# Patient Record
Sex: FEMALE | Race: WHITE | NOT HISPANIC OR LATINO | URBAN - METROPOLITAN AREA
[De-identification: names, ages, dates, MRNs, and addresses within clinical notes are randomized per-mention and may not be internally consistent; named-entity substitution may affect disease eponyms.]

---

## 2022-10-11 ENCOUNTER — TELEMEDICINE (OUTPATIENT)
Dept: PSYCHIATRY | Facility: CLINIC | Age: 57
End: 2022-10-11

## 2022-10-11 DIAGNOSIS — G47.00 INSOMNIA, UNSPECIFIED TYPE: ICD-10-CM

## 2022-10-11 DIAGNOSIS — F41.1 GENERALIZED ANXIETY DISORDER: ICD-10-CM

## 2022-10-11 DIAGNOSIS — F33.1 MAJOR DEPRESSIVE DISORDER, RECURRENT, MODERATE (HCC): Primary | ICD-10-CM

## 2022-10-11 PROCEDURE — 99214 OFFICE O/P EST MOD 30 MIN: CPT | Performed by: PSYCHIATRY & NEUROLOGY

## 2022-10-11 RX ORDER — BUSPIRONE HYDROCHLORIDE 15 MG/1
15 TABLET ORAL 3 TIMES DAILY
COMMUNITY
Start: 2022-09-20 | End: 2022-10-11 | Stop reason: SDUPTHER

## 2022-10-11 RX ORDER — QUETIAPINE FUMARATE 300 MG/1
600 TABLET, FILM COATED ORAL
COMMUNITY
Start: 2022-08-25 | End: 2022-10-11 | Stop reason: SDUPTHER

## 2022-10-11 RX ORDER — MIRTAZAPINE 45 MG/1
TABLET, FILM COATED ORAL
COMMUNITY
Start: 2022-09-18 | End: 2022-10-11 | Stop reason: SDUPTHER

## 2022-10-11 RX ORDER — BUSPIRONE HYDROCHLORIDE 15 MG/1
15 TABLET ORAL 3 TIMES DAILY
Qty: 270 TABLET | Refills: 0 | Status: SHIPPED | OUTPATIENT
Start: 2022-10-11

## 2022-10-11 RX ORDER — QUETIAPINE FUMARATE 100 MG/1
TABLET, FILM COATED ORAL
Qty: 180 TABLET | Refills: 0 | Status: SHIPPED | OUTPATIENT
Start: 2022-10-11

## 2022-10-11 RX ORDER — QUETIAPINE FUMARATE 100 MG/1
TABLET, FILM COATED ORAL
COMMUNITY
Start: 2022-08-03 | End: 2022-10-11 | Stop reason: SDUPTHER

## 2022-10-11 RX ORDER — DULOXETIN HYDROCHLORIDE 60 MG/1
120 CAPSULE, DELAYED RELEASE ORAL DAILY
Qty: 190 CAPSULE | Refills: 0 | Status: SHIPPED | OUTPATIENT
Start: 2022-10-11

## 2022-10-11 RX ORDER — GABAPENTIN 800 MG/1
800 TABLET ORAL 3 TIMES DAILY
COMMUNITY
Start: 2022-09-20 | End: 2022-10-11 | Stop reason: SDUPTHER

## 2022-10-11 RX ORDER — CLONIDINE HYDROCHLORIDE 0.2 MG/1
0.2 TABLET ORAL
Qty: 90 TABLET | Refills: 0 | Status: SHIPPED | OUTPATIENT
Start: 2022-10-11

## 2022-10-11 RX ORDER — DULOXETIN HYDROCHLORIDE 60 MG/1
120 CAPSULE, DELAYED RELEASE ORAL DAILY
COMMUNITY
Start: 2022-08-29 | End: 2022-10-11 | Stop reason: SDUPTHER

## 2022-10-11 RX ORDER — LAMOTRIGINE 25 MG/1
25 TABLET ORAL DAILY
Qty: 90 TABLET | Refills: 0 | Status: SHIPPED | OUTPATIENT
Start: 2022-10-11

## 2022-10-11 RX ORDER — QUETIAPINE FUMARATE 300 MG/1
600 TABLET, FILM COATED ORAL
Qty: 180 TABLET | Refills: 0 | Status: SHIPPED | OUTPATIENT
Start: 2022-10-11

## 2022-10-11 RX ORDER — MIRTAZAPINE 45 MG/1
45 TABLET, FILM COATED ORAL
Qty: 90 TABLET | Refills: 0 | Status: SHIPPED | OUTPATIENT
Start: 2022-10-11

## 2022-10-11 RX ORDER — GABAPENTIN 800 MG/1
800 TABLET ORAL 3 TIMES DAILY
Qty: 270 TABLET | Refills: 0 | Status: SHIPPED | OUTPATIENT
Start: 2022-10-11

## 2022-10-11 RX ORDER — CLONIDINE HYDROCHLORIDE 0.2 MG/1
0.2 TABLET ORAL
COMMUNITY
Start: 2022-09-18 | End: 2022-10-11 | Stop reason: SDUPTHER

## 2022-10-11 NOTE — PSYCH
Virtual Regular Visit    Verification of patient location:    Patient is located in the following state in which I hold an active license [place]      Assessment/Plan:  Add lamictal 25 mg   Continue other meds      Problem List Items Addressed This Visit    None     Visit Diagnoses     Major depressive disorder, recurrent, moderate (HCC)    -  Primary    Relevant Medications    lamoTRIgine (LaMICtal) 25 mg tablet    busPIRone (BUSPAR) 15 mg tablet    DULoxetine (CYMBALTA) 60 mg delayed release capsule    mirtazapine (REMERON) 45 MG tablet    QUEtiapine (SEROquel) 100 mg tablet    QUEtiapine (SEROquel) 300 mg tablet    Generalized anxiety disorder        Relevant Medications    busPIRone (BUSPAR) 15 mg tablet    cloNIDine (CATAPRES) 0 2 mg tablet    DULoxetine (CYMBALTA) 60 mg delayed release capsule    gabapentin (NEURONTIN) 800 mg tablet    mirtazapine (REMERON) 45 MG tablet    QUEtiapine (SEROquel) 100 mg tablet    QUEtiapine (SEROquel) 300 mg tablet    Insomnia, unspecified type        Relevant Medications    mirtazapine (REMERON) 45 MG tablet          Goals addressed in session: Goal 1          Reason for visit is   Chief Complaint   Patient presents with   • Medication Management        Encounter provider Cris Crain MD    Provider located at [address] [place] [address]  [phone]      Recent Visits  No visits were found meeting these conditions  Showing recent visits within past 7 days and meeting all other requirements  Today's Visits  Date Type Provider Dept   10/11/22 Telemedicine Cris Crain, [address] [place] [address] today's visits and meeting all other requirements  Future Appointments  No visits were found meeting these conditions  Showing future appointments within next 150 days and meeting all other requirements       The patient was identified by name and date of birth   Tammie Keane was informed that this is a telemedicine visit and that the visit is being conducted through[employer] and patient was informed this is a secure, HIPAA-complaint platform  She agrees to proceed     My office door was closed  No one else was in the room  She acknowledged consent and understanding of privacy and security of the video platform  The patient has agreed to participate and understands they can discontinue the visit at any time  There was no audio connection and I had to use the phone as well  Patient is aware this is a billable service  Subjective  Vernette Halsted is a 61 y o  female with mood swings, anxiety, insomnia presents for regular f/u   Compliant with meds, denies SE  H/o wellbutrin SE - SI  Mood - mood swings - 2-3  X week for 1 day feeling " happy", active and productive and 3-4 days of sadness, low energy, isolated, stays in bed, increased sleep  Anxiety - worries " all the time"; panicky in public places  Feels better at home, but is able to go to work  Denies medical problems      HPI     Past Medical History:   Diagnosis Date   • Gastroparesis        History reviewed  No pertinent surgical history      Current Outpatient Medications   Medication Sig Dispense Refill   • busPIRone (BUSPAR) 15 mg tablet Take 1 tablet (15 mg total) by mouth 3 (three) times a day 270 tablet 0   • cloNIDine (CATAPRES) 0 2 mg tablet Take 1 tablet (0 2 mg total) by mouth daily at bedtime 90 tablet 0   • DULoxetine (CYMBALTA) 60 mg delayed release capsule Take 2 capsules (120 mg total) by mouth daily 190 capsule 0   • gabapentin (NEURONTIN) 800 mg tablet Take 1 tablet (800 mg total) by mouth 3 (three) times a day 270 tablet 0   • lamoTRIgine (LaMICtal) 25 mg tablet Take 1 tablet (25 mg total) by mouth daily 90 tablet 0   • mirtazapine (REMERON) 45 MG tablet Take 1 tablet (45 mg total) by mouth daily at bedtime 90 tablet 0   • QUEtiapine (SEROquel) 100 mg tablet Take 1 tab in thre morning and at noon 180 tablet 0   • QUEtiapine (SEROquel) 300 mg tablet Take 2 tablets (600 mg total) by mouth daily at bedtime 180 tablet 0     No current facility-administered medications for this visit  Not on File    Review of Systems   Constitutional: Negative for activity change and appetite change  Psychiatric/Behavioral: Positive for sleep disturbance (sleeps 5 hrs)  Negative for suicidal ideas  Video Exam    There were no vitals filed for this visit  Physical Exam  Constitutional:       Appearance: Normal appearance  She is normal weight  Neurological:      Mental Status: She is alert  Psychiatric:         Attention and Perception: Attention and perception normal          Mood and Affect: Affect normal  Mood is anxious and depressed  Speech: Speech normal          Behavior: Behavior is cooperative  Thought Content:  Thought content normal          Judgment: Judgment normal           I spent 17 minutes directly with the patient during this visit

## 2022-10-31 ENCOUNTER — TELEPHONE (OUTPATIENT)
Dept: PSYCHIATRY | Facility: CLINIC | Age: 57
End: 2022-10-31

## 2022-10-31 NOTE — TELEPHONE ENCOUNTER
Client left message for Dr Bart Dahl that she is still having trouble sleeping  She saw her for an appointment a few weeks ago, but feels "I need something else to help me sleep, or an increase in medication " Message sent to Dr Bart Dahl for recommendations

## 2022-11-01 ENCOUNTER — TELEPHONE (OUTPATIENT)
Dept: PSYCHIATRY | Facility: CLINIC | Age: 57
End: 2022-11-01

## 2022-11-01 NOTE — TELEPHONE ENCOUNTER
Client called and informed per Dr Chela Holman: that she is on meds for insomnia already, and if she wants to discuss any further med adjustment she can make a sooner appointment  Client states"I am going to keep trying the meds I am on, and will call for a sooner appointment if needed " Informed Dr Chela Holman of above

## 2023-01-09 ENCOUNTER — TELEMEDICINE (OUTPATIENT)
Dept: PSYCHIATRY | Facility: CLINIC | Age: 58
End: 2023-01-09

## 2023-01-09 DIAGNOSIS — G47.00 INSOMNIA, UNSPECIFIED TYPE: ICD-10-CM

## 2023-01-09 DIAGNOSIS — F33.1 MAJOR DEPRESSIVE DISORDER, RECURRENT, MODERATE (HCC): Primary | ICD-10-CM

## 2023-01-09 DIAGNOSIS — F41.1 GENERALIZED ANXIETY DISORDER: ICD-10-CM

## 2023-01-09 RX ORDER — LAMOTRIGINE 25 MG/1
50 TABLET ORAL DAILY
Qty: 180 TABLET | Refills: 0 | Status: SHIPPED | OUTPATIENT
Start: 2023-01-09

## 2023-01-09 RX ORDER — PANTOPRAZOLE SODIUM 40 MG/1
40 TABLET, DELAYED RELEASE ORAL 2 TIMES DAILY
COMMUNITY
Start: 2022-12-26

## 2023-01-09 RX ORDER — BUSPIRONE HYDROCHLORIDE 15 MG/1
15 TABLET ORAL 3 TIMES DAILY
Qty: 270 TABLET | Refills: 0 | Status: SHIPPED | OUTPATIENT
Start: 2023-01-09

## 2023-01-09 RX ORDER — DOXYCYCLINE HYCLATE 50 MG/1
324 CAPSULE, GELATIN COATED ORAL
COMMUNITY
Start: 2022-11-28 | End: 2023-02-26

## 2023-01-09 RX ORDER — GABAPENTIN 800 MG/1
800 TABLET ORAL 3 TIMES DAILY
Qty: 270 TABLET | Refills: 0 | Status: SHIPPED | OUTPATIENT
Start: 2023-01-09

## 2023-01-09 RX ORDER — SODIUM FLUORIDE 6 MG/ML
PASTE, DENTIFRICE DENTAL 2 TIMES DAILY
COMMUNITY
Start: 2023-01-07

## 2023-01-09 RX ORDER — MIRTAZAPINE 45 MG/1
45 TABLET, FILM COATED ORAL
Qty: 90 TABLET | Refills: 0 | Status: SHIPPED | OUTPATIENT
Start: 2023-01-09

## 2023-01-09 RX ORDER — PSYLLIUM HUSK 3.4 G/7G
1 POWDER ORAL DAILY
COMMUNITY
Start: 2022-11-18

## 2023-01-09 RX ORDER — DULOXETIN HYDROCHLORIDE 60 MG/1
120 CAPSULE, DELAYED RELEASE ORAL DAILY
Qty: 180 CAPSULE | Refills: 0 | Status: SHIPPED | OUTPATIENT
Start: 2023-01-09

## 2023-01-09 RX ORDER — QUETIAPINE FUMARATE 300 MG/1
600 TABLET, FILM COATED ORAL
Qty: 180 TABLET | Refills: 0 | Status: SHIPPED | OUTPATIENT
Start: 2023-01-09

## 2023-01-09 RX ORDER — CLONIDINE HYDROCHLORIDE 0.2 MG/1
0.2 TABLET ORAL
Qty: 90 TABLET | Refills: 0 | Status: SHIPPED | OUTPATIENT
Start: 2023-01-09

## 2023-01-09 RX ORDER — QUETIAPINE FUMARATE 100 MG/1
TABLET, FILM COATED ORAL
Qty: 180 TABLET | Refills: 0 | Status: SHIPPED | OUTPATIENT
Start: 2023-01-09

## 2023-01-09 NOTE — PSYCH
Virtual Regular Visit    Verification of patient location:    Patient is located in the following state in which I hold an [place] license [place]      Assessment/Plan:    Problem List Items Addressed This Visit    None  Visit Diagnoses     Major depressive disorder, recurrent, moderate (HCC)    -  Primary    Relevant Medications    QUEtiapine (SEROquel) 300 mg tablet    QUEtiapine (SEROquel) 100 mg tablet    mirtazapine (REMERON) 45 MG tablet    lamoTRIgine (LaMICtal) 25 mg tablet    DULoxetine (CYMBALTA) 60 mg delayed release capsule    busPIRone (BUSPAR) 15 mg tablet    Generalized anxiety disorder        Relevant Medications    QUEtiapine (SEROquel) 300 mg tablet    QUEtiapine (SEROquel) 100 mg tablet    mirtazapine (REMERON) 45 MG tablet    gabapentin (NEURONTIN) 800 mg tablet    DULoxetine (CYMBALTA) 60 mg delayed release capsule    cloNIDine (CATAPRES) 0 2 mg tablet    busPIRone (BUSPAR) 15 mg tablet    Insomnia, unspecified type        Relevant Medications    mirtazapine (REMERON) 45 MG tablet          Goals addressed in session: Goal 1          Reason for visit is   Chief Complaint   Patient presents with   • Medication Management        Encounter provider Pb Watters MD    Provider located at [address] [facility]  [address]  [phone]      Recent Visits  No visits were found meeting these conditions  Showing recent visits within past 7 days and meeting all other requirements  Today's Visits  Date Type Provider Dept   01/09/23 Telemedicine Pb Watters, [address] [place] [address] today's visits and meeting all other requirements  Future Appointments  No visits were found meeting these conditions  Showing future appointments within next 150 days and meeting all other requirements       The patient was identified by name and date of birth   Tigre Nam was informed that this is a telemedicine visit and that the [facility]  She agrees to proceed     My office door was closed  No one else was in the room  She acknowledged consent and understanding of privacy and security of the video platform  The patient has agreed to participate and understands they can discontinue the visit at any time  Patient is aware this is a billable service  Subjective  Mona Gupta is a 61 y o  female with depression, anxiety and insomnia presents for regular f/u   Compliant with meds, denies SE  Recent admission for anemia - improved  works    HPI   Mood - better energy and motivation;more social; less mood swings;  but still gets depressed 2 x week ; all day - sad, tired  Lamictal is effective   Anxiety - anticipating anxiety  C/o poor sleep - takes seroquel 100 mg qd, 600 mg hs  Past Medical History:   Diagnosis Date   • Gastroparesis        History reviewed  No pertinent surgical history      Current Outpatient Medications   Medication Sig Dispense Refill   • busPIRone (BUSPAR) 15 mg tablet Take 1 tablet (15 mg total) by mouth 3 (three) times a day 270 tablet 0   • cloNIDine (CATAPRES) 0 2 mg tablet Take 1 tablet (0 2 mg total) by mouth daily at bedtime 90 tablet 0   • DULoxetine (CYMBALTA) 60 mg delayed release capsule Take 2 capsules (120 mg total) by mouth daily 180 capsule 0   • ferrous gluconate (FERGON) 324 mg tablet Take 324 mg by mouth     • gabapentin (NEURONTIN) 800 mg tablet Take 1 tablet (800 mg total) by mouth 3 (three) times a day 270 tablet 0   • lamoTRIgine (LaMICtal) 25 mg tablet Take 2 tablets (50 mg total) by mouth daily 180 tablet 0   • mirtazapine (REMERON) 45 MG tablet Take 1 tablet (45 mg total) by mouth daily at bedtime 90 tablet 0   • QUEtiapine (SEROquel) 100 mg tablet Take 1 tab  at noon and at night with 600 mg 180 tablet 0   • QUEtiapine (SEROquel) 300 mg tablet Take 2 tablets (600 mg total) by mouth daily at bedtime 180 tablet 0   • pantoprazole (PROTONIX) 40 mg tablet Take 40 mg by mouth 2 (two) times a day     • RA Vitamin B-12 TR 1000 MCG TBCR Take 1 tablet by mouth daily     • Sodium Fluoride 5000 PPM 1 1 % PSTE 2 (two) times a day       No current facility-administered medications for this visit  Not on File    Review of Systems   Constitutional: Negative for activity change  Psychiatric/Behavioral: Positive for dysphoric mood and sleep disturbance (5 hrs)  Negative for suicidal ideas  Video Exam    There were no vitals filed for this visit  Physical Exam  Constitutional:       Appearance: Normal appearance  She is normal weight  Neurological:      Mental Status: She is alert  Psychiatric:         Attention and Perception: Attention and perception normal          Mood and Affect: Affect normal  Mood is depressed  Speech: Speech normal          Behavior: Behavior normal  Behavior is cooperative  Thought Content:  Thought content normal          Judgment: Judgment normal           Visit Time    Visit Start Time: 8 20 am   Visit Stop Time: 8 29 am   Total Visit Duration: 19 minutes

## 2023-03-09 ENCOUNTER — TELEPHONE (OUTPATIENT)
Dept: PSYCHIATRY | Facility: CLINIC | Age: 58
End: 2023-03-09

## 2023-03-22 ENCOUNTER — TELEMEDICINE (OUTPATIENT)
Dept: PSYCHIATRY | Facility: CLINIC | Age: 58
End: 2023-03-22

## 2023-03-22 DIAGNOSIS — F41.1 GENERALIZED ANXIETY DISORDER: ICD-10-CM

## 2023-03-22 DIAGNOSIS — G47.00 INSOMNIA, UNSPECIFIED TYPE: ICD-10-CM

## 2023-03-22 DIAGNOSIS — F33.1 MAJOR DEPRESSIVE DISORDER, RECURRENT, MODERATE (HCC): Primary | ICD-10-CM

## 2023-03-22 RX ORDER — DULOXETIN HYDROCHLORIDE 60 MG/1
120 CAPSULE, DELAYED RELEASE ORAL DAILY
Qty: 180 CAPSULE | Refills: 0 | Status: SHIPPED | OUTPATIENT
Start: 2023-03-22

## 2023-03-22 RX ORDER — LAMOTRIGINE 25 MG/1
50 TABLET ORAL DAILY
Qty: 180 TABLET | Refills: 0 | Status: SHIPPED | OUTPATIENT
Start: 2023-03-22

## 2023-03-22 RX ORDER — MIRTAZAPINE 45 MG/1
45 TABLET, FILM COATED ORAL
Qty: 90 TABLET | Refills: 0 | Status: SHIPPED | OUTPATIENT
Start: 2023-03-22

## 2023-03-22 RX ORDER — BUSPIRONE HYDROCHLORIDE 15 MG/1
15 TABLET ORAL 3 TIMES DAILY
Qty: 270 TABLET | Refills: 0 | Status: SHIPPED | OUTPATIENT
Start: 2023-03-22

## 2023-03-22 RX ORDER — CLONIDINE HYDROCHLORIDE 0.2 MG/1
0.2 TABLET ORAL
Qty: 90 TABLET | Refills: 0 | Status: SHIPPED | OUTPATIENT
Start: 2023-03-22

## 2023-03-22 RX ORDER — QUETIAPINE FUMARATE 300 MG/1
600 TABLET, FILM COATED ORAL
Qty: 180 TABLET | Refills: 0 | Status: SHIPPED | OUTPATIENT
Start: 2023-03-22

## 2023-03-22 RX ORDER — GABAPENTIN 800 MG/1
800 TABLET ORAL 3 TIMES DAILY
Qty: 270 TABLET | Refills: 0 | Status: SHIPPED | OUTPATIENT
Start: 2023-03-22

## 2023-03-22 NOTE — PSYCH
Virtual Regular Visit    Verification of patient location:    Patient is located in the following state in which I hold an [place] license [place]      Assessment/Plan:    Problem List Items Addressed This Visit    None      Goals addressed in session: Goal 1          Reason for visit is   Chief Complaint   Patient presents with   • Medication Management        Encounter provider Brisa Ghotra MD    Provider located at [address] [facility]  100 [facility]  [address]  [phone]      Recent Visits  No visits were found meeting these conditions  Showing recent visits within past 7 days and meeting all other requirements  Today's Visits  Date Type Provider Dept   03/22/23 Telemedicine Brisa Ghotra, [address] [facility] today's visits and meeting all other requirements  Future Appointments  No visits were found meeting these conditions  Showing future appointments within next 150 days and meeting all other requirements       The patient was identified by name and date of birth  Mona Gupta was informed that this is a telemedicine visit and that the visit is being conducted [employer]  She agrees to proceed     My office door was closed  No one else was in the room  She acknowledged consent and understanding of privacy and security of the video platform  The patient has agreed to participate and understands they can discontinue the visit at any time  Patient is aware this is a billable service  Subjective  Mona Gupta is a 60 y o  female with depression, anxiety, insomnia presents for regular f/u     compliant with meds, denies SE  Denies acute medical problems  Walks 5 miles daily  Works part time, busy with house work      HPI   Mood - reports as good and stable; mild episodes of sadness sometimes   Good energy, active, social  Anxiety - mostly controlled; denies panic attacks or racing thoughts    Past Medical History:   Diagnosis Date   • Gastroparesis        History reviewed  No pertinent surgical history  Current Outpatient Medications   Medication Sig Dispense Refill   • cloNIDine (CATAPRES) 0 2 mg tablet Take 1 tablet (0 2 mg total) by mouth daily at bedtime 90 tablet 0   • DULoxetine (CYMBALTA) 60 mg delayed release capsule Take 2 capsules (120 mg total) by mouth daily 180 capsule 0   • gabapentin (NEURONTIN) 800 mg tablet Take 1 tablet (800 mg total) by mouth 3 (three) times a day 270 tablet 0   • lamoTRIgine (LaMICtal) 25 mg tablet Take 2 tablets (50 mg total) by mouth daily 180 tablet 0   • mirtazapine (REMERON) 45 MG tablet Take 1 tablet (45 mg total) by mouth daily at bedtime 90 tablet 0   • QUEtiapine (SEROquel) 100 mg tablet Take 1 tab  at noon and at night with 600 mg 180 tablet 0   • QUEtiapine (SEROquel) 300 mg tablet Take 2 tablets (600 mg total) by mouth daily at bedtime 180 tablet 0   • busPIRone (BUSPAR) 15 mg tablet Take 1 tablet (15 mg total) by mouth 3 (three) times a day 270 tablet 0   • ferrous gluconate (FERGON) 324 mg tablet Take 324 mg by mouth     • pantoprazole (PROTONIX) 40 mg tablet Take 40 mg by mouth 2 (two) times a day     • RA Vitamin B-12 TR 1000 MCG TBCR Take 1 tablet by mouth daily     • Sodium Fluoride 5000 PPM 1 1 % PSTE 2 (two) times a day       No current facility-administered medications for this visit  Not on File    Review of Systems   Constitutional: Negative for activity change and appetite change  Psychiatric/Behavioral: Negative for dysphoric mood, sleep disturbance and suicidal ideas  The patient is not nervous/anxious  Video Exam    There were no vitals filed for this visit  Physical Exam  Constitutional:       Appearance: Normal appearance  She is normal weight  Neurological:      Mental Status: She is alert     Psychiatric:         Attention and Perception: Attention and perception normal          Mood and Affect: Mood and affect normal  Speech: Speech normal          Behavior: Behavior normal  Behavior is cooperative  Thought Content:  Thought content normal          Judgment: Judgment normal           Visit Time    Visit Start Time: 8 58 am   Visit Stop Time: 9 05 am   Total Visit Duration: 15 minutes

## 2023-05-25 DIAGNOSIS — F33.1 MAJOR DEPRESSIVE DISORDER, RECURRENT, MODERATE (HCC): ICD-10-CM

## 2023-05-25 RX ORDER — QUETIAPINE FUMARATE 100 MG/1
TABLET, FILM COATED ORAL
Qty: 60 TABLET | Refills: 0 | Status: SHIPPED | OUTPATIENT
Start: 2023-05-25

## 2023-05-25 RX ORDER — QUETIAPINE FUMARATE 300 MG/1
600 TABLET, FILM COATED ORAL
Qty: 60 TABLET | Refills: 0 | Status: SHIPPED | OUTPATIENT
Start: 2023-05-25

## 2023-06-08 DIAGNOSIS — G47.00 INSOMNIA, UNSPECIFIED TYPE: ICD-10-CM

## 2023-06-08 RX ORDER — MIRTAZAPINE 45 MG/1
45 TABLET, FILM COATED ORAL
Qty: 90 TABLET | Refills: 0 | Status: SHIPPED | OUTPATIENT
Start: 2023-06-08

## 2023-06-21 ENCOUNTER — TELEMEDICINE (OUTPATIENT)
Dept: PSYCHIATRY | Facility: CLINIC | Age: 58
End: 2023-06-21
Payer: COMMERCIAL

## 2023-06-21 DIAGNOSIS — G47.00 INSOMNIA, UNSPECIFIED TYPE: ICD-10-CM

## 2023-06-21 DIAGNOSIS — F41.1 GENERALIZED ANXIETY DISORDER: ICD-10-CM

## 2023-06-21 DIAGNOSIS — F33.1 MAJOR DEPRESSIVE DISORDER, RECURRENT, MODERATE (HCC): Primary | ICD-10-CM

## 2023-06-21 PROCEDURE — 99214 OFFICE O/P EST MOD 30 MIN: CPT | Performed by: PSYCHIATRY & NEUROLOGY

## 2023-06-21 RX ORDER — CLONIDINE HYDROCHLORIDE 0.2 MG/1
0.2 TABLET ORAL
Qty: 90 TABLET | Refills: 0 | Status: SHIPPED | OUTPATIENT
Start: 2023-06-21

## 2023-06-21 RX ORDER — LAMOTRIGINE 25 MG/1
50 TABLET ORAL DAILY
Qty: 20 TABLET | Refills: 0 | Status: SHIPPED | OUTPATIENT
Start: 2023-06-21

## 2023-06-21 RX ORDER — QUETIAPINE FUMARATE 100 MG/1
TABLET, FILM COATED ORAL
Qty: 180 TABLET | Refills: 0 | Status: SHIPPED | OUTPATIENT
Start: 2023-06-21

## 2023-06-21 RX ORDER — BUSPIRONE HYDROCHLORIDE 15 MG/1
15 TABLET ORAL 3 TIMES DAILY
Qty: 270 TABLET | Refills: 0 | Status: SHIPPED | OUTPATIENT
Start: 2023-06-21

## 2023-06-21 RX ORDER — QUETIAPINE FUMARATE 300 MG/1
600 TABLET, FILM COATED ORAL
Qty: 180 TABLET | Refills: 0 | Status: SHIPPED | OUTPATIENT
Start: 2023-06-21

## 2023-06-21 RX ORDER — MIRTAZAPINE 45 MG/1
45 TABLET, FILM COATED ORAL
Qty: 90 TABLET | Refills: 0 | Status: SHIPPED | OUTPATIENT
Start: 2023-06-21

## 2023-06-21 RX ORDER — GABAPENTIN 800 MG/1
800 TABLET ORAL 3 TIMES DAILY
Qty: 270 TABLET | Refills: 0 | Status: SHIPPED | OUTPATIENT
Start: 2023-06-21

## 2023-06-21 RX ORDER — DULOXETIN HYDROCHLORIDE 60 MG/1
120 CAPSULE, DELAYED RELEASE ORAL DAILY
Qty: 180 CAPSULE | Refills: 0 | Status: SHIPPED | OUTPATIENT
Start: 2023-06-21

## 2023-06-21 NOTE — PSYCH
Virtual Regular Visit    Verification of patient location:    Patient is located at Home in the following state in which I hold an active license [place]      Assessment/Plan:    Problem List Items Addressed This Visit    None      Goals addressed in session: Goal 1          Reason for visit is   Chief Complaint   Patient presents with   • Medication Management        Encounter provider Betsy Rueda MD    Provider located at [address] [facility]  100 [facility]  [address]  [phone]      Recent Visits  No visits were found meeting these conditions  Showing recent visits within past 7 days and meeting all other requirements  Today's Visits  Date Type Provider Dept   06/21/23 Telemedicine Betsy Rueda, [facility] today's visits and meeting all other requirements  Future Appointments  No visits were found meeting these conditions  Showing future appointments within next 150 days and meeting all other requirements       The patient was identified by name and date of birth  Sri Yanes was informed that this is a telemedicine visit and that the visit is being conducted [employer]  She agrees to proceed     My office door was closed  No one else was in the room  She acknowledged consent and understanding of privacy and security of the video platform  The patient has agreed to participate and understands they can discontinue the visit at any time  Patient is aware this is a billable service  Subjective  Sri Yanes is a 60 y o  female with depression, anxiety and insomnia presents for regular f/u   Compliant with meds, denies SE    H/o asthma, inhaler prn  Works 30-40 hrs/week  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      HPI   Mood - reports as mostly good and stable; denies depression or mood swings   Active, functions at baseline; does ADLs  Anxiety - mostly controlled; but before parties or medical appts; before days with granddaughter - anticipating anxiety - worries; racing thoughts; tachycardia, sweating, restless, pacing, avoiding behavior and poor sleep    Past Medical History:   Diagnosis Date   • Gastroparesis        History reviewed  No pertinent surgical history  Current Outpatient Medications   Medication Sig Dispense Refill   • busPIRone (BUSPAR) 15 mg tablet Take 1 tablet (15 mg total) by mouth 3 (three) times a day 270 tablet 0   • cloNIDine (CATAPRES) 0 2 mg tablet Take 1 tablet (0 2 mg total) by mouth daily at bedtime 90 tablet 0   • DULoxetine (CYMBALTA) 60 mg delayed release capsule Take 2 capsules (120 mg total) by mouth daily 180 capsule 0   • ferrous gluconate (FERGON) 324 mg tablet Take 324 mg by mouth     • gabapentin (NEURONTIN) 800 mg tablet Take 1 tablet (800 mg total) by mouth 3 (three) times a day 270 tablet 0   • lamoTRIgine (LaMICtal) 25 mg tablet Take 2 tablets (50 mg total) by mouth daily 20 tablet 0   • mirtazapine (REMERON) 45 MG tablet Take 1 tablet (45 mg total) by mouth daily at bedtime 90 tablet 0   • pantoprazole (PROTONIX) 40 mg tablet Take 40 mg by mouth 2 (two) times a day     • QUEtiapine (SEROquel) 100 mg tablet Take 1 tab  at noon and at night with 600 mg 60 tablet 0   • QUEtiapine (SEROquel) 300 mg tablet Take 2 tablets (600 mg total) by mouth daily at bedtime 60 tablet 0   • RA Vitamin B-12 TR 1000 MCG TBCR Take 1 tablet by mouth daily     • Sodium Fluoride 5000 PPM 1 1 % PSTE 2 (two) times a day       No current facility-administered medications for this visit  Not on File    Review of Systems   Constitutional: Negative for activity change  Psychiatric/Behavioral: Negative for dysphoric mood, sleep disturbance and suicidal ideas  The patient is nervous/anxious  Video Exam    There were no vitals filed for this visit  Physical Exam  Constitutional:       Appearance: Normal appearance  She is normal weight     Neurological: Mental Status: She is alert  Psychiatric:         Attention and Perception: Attention and perception normal          Mood and Affect: Affect normal  Mood is anxious  Speech: Speech normal          Behavior: Behavior normal  Behavior is cooperative  Thought Content:  Thought content normal          Judgment: Judgment normal           Visit Time    Visit Start Time: 8 58 am   Visit Stop Time: 9 06 am   Total Visit Duration: 17 minutes

## 2023-07-21 DIAGNOSIS — F33.1 MAJOR DEPRESSIVE DISORDER, RECURRENT, MODERATE (HCC): ICD-10-CM

## 2023-07-24 DIAGNOSIS — F33.1 MAJOR DEPRESSIVE DISORDER, RECURRENT, MODERATE (HCC): ICD-10-CM

## 2023-07-24 RX ORDER — LAMOTRIGINE 25 MG/1
50 TABLET ORAL DAILY
Qty: 60 TABLET | Refills: 1 | Status: SHIPPED | OUTPATIENT
Start: 2023-07-24

## 2023-07-24 RX ORDER — LAMOTRIGINE 25 MG/1
50 TABLET ORAL DAILY
Qty: 20 TABLET | Refills: 0 | OUTPATIENT
Start: 2023-07-24

## 2023-08-27 DIAGNOSIS — F33.1 MAJOR DEPRESSIVE DISORDER, RECURRENT, MODERATE (HCC): ICD-10-CM

## 2023-08-28 RX ORDER — QUETIAPINE FUMARATE 300 MG/1
600 TABLET, FILM COATED ORAL
Qty: 180 TABLET | Refills: 0 | OUTPATIENT
Start: 2023-08-28

## 2023-09-13 ENCOUNTER — TELEMEDICINE (OUTPATIENT)
Dept: PSYCHIATRY | Facility: CLINIC | Age: 58
End: 2023-09-13
Payer: COMMERCIAL

## 2023-09-13 DIAGNOSIS — F41.1 GENERALIZED ANXIETY DISORDER: ICD-10-CM

## 2023-09-13 DIAGNOSIS — G47.00 INSOMNIA, UNSPECIFIED TYPE: ICD-10-CM

## 2023-09-13 DIAGNOSIS — F33.1 MAJOR DEPRESSIVE DISORDER, RECURRENT, MODERATE (HCC): Primary | ICD-10-CM

## 2023-09-13 PROCEDURE — 99214 OFFICE O/P EST MOD 30 MIN: CPT | Performed by: PSYCHIATRY & NEUROLOGY

## 2023-09-13 RX ORDER — CLONIDINE HYDROCHLORIDE 0.2 MG/1
0.2 TABLET ORAL
Qty: 90 TABLET | Refills: 0 | Status: SHIPPED | OUTPATIENT
Start: 2023-09-13

## 2023-09-13 RX ORDER — MIRTAZAPINE 45 MG/1
45 TABLET, FILM COATED ORAL
Qty: 90 TABLET | Refills: 0 | Status: SHIPPED | OUTPATIENT
Start: 2023-09-13

## 2023-09-13 RX ORDER — BUSPIRONE HYDROCHLORIDE 15 MG/1
15 TABLET ORAL 3 TIMES DAILY
Qty: 270 TABLET | Refills: 0 | Status: SHIPPED | OUTPATIENT
Start: 2023-09-13

## 2023-09-13 RX ORDER — QUETIAPINE FUMARATE 300 MG/1
600 TABLET, FILM COATED ORAL
Qty: 180 TABLET | Refills: 0 | Status: SHIPPED | OUTPATIENT
Start: 2023-09-13

## 2023-09-13 RX ORDER — DULOXETIN HYDROCHLORIDE 60 MG/1
120 CAPSULE, DELAYED RELEASE ORAL DAILY
Qty: 180 CAPSULE | Refills: 0 | Status: SHIPPED | OUTPATIENT
Start: 2023-09-13

## 2023-09-13 RX ORDER — PROPRANOLOL HYDROCHLORIDE 10 MG/1
10 TABLET ORAL 2 TIMES DAILY PRN
Qty: 60 TABLET | Refills: 2 | Status: SHIPPED | OUTPATIENT
Start: 2023-09-13

## 2023-09-13 RX ORDER — GABAPENTIN 800 MG/1
800 TABLET ORAL 3 TIMES DAILY
Qty: 270 TABLET | Refills: 0 | Status: SHIPPED | OUTPATIENT
Start: 2023-09-13

## 2023-09-13 RX ORDER — LAMOTRIGINE 25 MG/1
50 TABLET ORAL DAILY
Qty: 60 TABLET | Refills: 1 | Status: SHIPPED | OUTPATIENT
Start: 2023-09-13

## 2023-09-13 NOTE — PATIENT INSTRUCTIONS
Add propranolol 10 mg bid prn  Continue other meds  Consider PTSD therapy or PHP  Pt declined to be referred to [facility]

## 2023-09-13 NOTE — PSYCH
Virtual Regular Visit    Verification of patient location:    Patient is located at Home in the following state in which I hold an active license [place]      Assessment/Plan:    Problem List Items Addressed This Visit    None      Goals addressed in session: Goal 1          Reason for visit is   Chief Complaint   Patient presents with   • Medication Management        Encounter provider Michele Ferrera MD    Provider located at [address] [place] [address],[address]  [address] [place] [address]  [phone]      Recent Visits  No visits were found meeting these conditions. Showing recent visits within past 7 days and meeting all other requirements  Today's Visits  Date Type Provider Dept   09/13/23 Telemedicine Michele Ferrera, [address] today's visits and meeting all other requirements  Future Appointments  No visits were found meeting these conditions. Showing future appointments within next 150 days and meeting all other requirements     My office door was closed. TREATMENT PLAN (Medication Management Only)        [facility]    Name and Date of Birth:  Kwame Logan 60 y.o. 1965  Date of Treatment Plan: September 13, 2023  Diagnosis/Diagnoses:  No diagnosis found. Strengths/Personal Resources for Self-Care: taking medications as prescribed, motivation for treatment. Area/Areas of need (in own words): anxiety, depression, sleep problems  1. Long Term Goal: continue improvement insleep. Target Date:6 months - 3/13/2024  Person/Persons responsible for completion of goal: Cassy  2. Short Term Objective (s) - How will we reach this goal?:   A. Provider new recommended medication/dosage changes and/or continue medication(s): continue current medications as prescribed Cymbalta, Lamictal, Seroquel, Atarax, Propranolol. B. N/A.  C. N/A.   Target Date:6 months - 3/13/2024  Person/Persons Responsible for Completion of Goal: Felisha Ramon  Progress Towards Goals: continuing treatment  Treatment Modality: medication management every 3 months  Review due 180 days from date of this plan: 6 months - 3/13/2024  Expected length of service: ongoing treatment  My Physician/PA/NP and I have developed this plan together and I agree to work on the goals and objectives. I understand the treatment goals that were developed for my treatment. The patient was identified by name and date of birth. Karan Soto was informed that this is a telemedicine visit and that the visit is being conducted [employer]. She agrees to proceed. .   No one else was in the room. She acknowledged consent and understanding of privacy and security of the video platform. The patient has agreed to participate and understands they can discontinue the visit at any time. Patient is aware this is a billable service. Subjective  Karan Soto is a 60 y.o. female with depression, anxiety, insomnia presents for regular f/u   Takes Seroquel 600 mg , reports higher dose not effective  Continued other meds, denies SE . In [facility] at [facility] - trauma discussion - getting worse emotionally  Denies acute medical problems      HPI   Anxiety - increased racing thoughts and worries; poor focus  3 x week - for 1 hr - episodes of tachycardia, hyperventilation, pacing; " losing my mind"  Pt uses coping skills - not much effective  Mood - episodes of low self esteem; preoccupied with past trauma experience    Past Medical History:   Diagnosis Date   • Gastroparesis        History reviewed. No pertinent surgical history.     Current Outpatient Medications   Medication Sig Dispense Refill   • busPIRone (BUSPAR) 15 mg tablet Take 1 tablet (15 mg total) by mouth 3 (three) times a day 270 tablet 0   • cloNIDine (CATAPRES) 0.2 mg tablet Take 1 tablet (0.2 mg total) by mouth daily at bedtime 90 tablet 0   • DULoxetine (CYMBALTA) 60 mg delayed release capsule Take 2 capsules (120 mg total) by mouth daily 180 capsule 0   • gabapentin (NEURONTIN) 800 mg tablet Take 1 tablet (800 mg total) by mouth 3 (three) times a day 270 tablet 0   • lamoTRIgine (LaMICtal) 25 mg tablet Take 2 tablets (50 mg total) by mouth daily 60 tablet 1   • mirtazapine (REMERON) 45 MG tablet Take 1 tablet (45 mg total) by mouth daily at bedtime 90 tablet 0   • QUEtiapine (SEROquel) 100 mg tablet Take 1 or 2 tab at night with 600 mg 180 tablet 0   • QUEtiapine (SEROquel) 300 mg tablet Take 2 tablets (600 mg total) by mouth daily at bedtime 180 tablet 0   • ferrous gluconate (FERGON) 324 mg tablet Take 324 mg by mouth     • pantoprazole (PROTONIX) 40 mg tablet Take 40 mg by mouth 2 (two) times a day     • RA Vitamin B-12 TR 1000 MCG TBCR Take 1 tablet by mouth daily     • Sodium Fluoride 5000 PPM 1.1 % PSTE 2 (two) times a day       No current facility-administered medications for this visit. Not on File    Review of Systems   Constitutional: Negative for activity change and appetite change. Psychiatric/Behavioral: Positive for dysphoric mood and sleep disturbance (difficult to fall asleep). Negative for suicidal ideas. The patient is nervous/anxious. Video Exam    There were no vitals filed for this visit. Physical Exam  Constitutional:       Appearance: Normal appearance. She is normal weight. Neurological:      Mental Status: She is alert. Psychiatric:         Attention and Perception: Attention and perception normal.         Mood and Affect: Affect normal. Mood is anxious. Speech: Speech normal.         Behavior: Behavior normal. Behavior is cooperative. Thought Content:  Thought content normal.         Judgment: Judgment normal.          Visit Time    Visit Start Time: 8.58 am   Visit Stop Time: 9.09 am   Total Visit Duration: 18 minutes

## 2023-09-14 ENCOUNTER — TELEPHONE (OUTPATIENT)
Dept: PSYCHIATRY | Facility: CLINIC | Age: 58
End: 2023-09-14

## 2023-09-14 DIAGNOSIS — F33.1 MAJOR DEPRESSIVE DISORDER, RECURRENT, MODERATE (HCC): ICD-10-CM

## 2023-09-14 NOTE — TELEPHONE ENCOUNTER
Patient calling because she did not receive script for seroquel 100mg. Script sent yesterday for 300mg 2 at bedtime but patient states she takes 100mg 2 tabs during the day as well. Med note from 6/21 states: "Take seroquel 100 mg 1-2 tab hs with 600 mg for anxiety". Pt requesting script be sent to new pharmacy-[phone]. Forwarding for review.

## 2023-09-19 DIAGNOSIS — F41.1 GENERALIZED ANXIETY DISORDER: Primary | ICD-10-CM

## 2023-09-19 RX ORDER — QUETIAPINE FUMARATE 100 MG/1
TABLET, FILM COATED ORAL
Qty: 180 TABLET | Refills: 0 | Status: SHIPPED | OUTPATIENT
Start: 2023-09-19

## 2023-09-19 NOTE — PROGRESS NOTES
Prescription for PRN quetiapine (Seroquel) sent to be taken with the 600 mg as bed time for anxiety as mentioned in last visit note.

## 2023-09-19 NOTE — TELEPHONE ENCOUNTER
Pt called back, said that this was sent to the wrong [facility], asked that it be resent to the one on [address]

## 2023-09-21 ENCOUNTER — TELEPHONE (OUTPATIENT)
Dept: PSYCHIATRY | Facility: CLINIC | Age: 58
End: 2023-09-21

## 2023-09-21 NOTE — TELEPHONE ENCOUNTER
Called client to schedule 3 month follow-up appt. with Dr. Morgan Rock. Client said she will call back tomorrow to schedule appt.

## 2023-11-13 DIAGNOSIS — F33.1 MAJOR DEPRESSIVE DISORDER, RECURRENT, MODERATE (HCC): ICD-10-CM

## 2023-11-13 RX ORDER — LAMOTRIGINE 25 MG/1
50 TABLET ORAL DAILY
Qty: 60 TABLET | Refills: 0 | Status: SHIPPED | OUTPATIENT
Start: 2023-11-13

## 2023-11-27 DIAGNOSIS — G47.00 INSOMNIA, UNSPECIFIED TYPE: ICD-10-CM

## 2023-11-27 RX ORDER — MIRTAZAPINE 45 MG/1
45 TABLET, FILM COATED ORAL
Qty: 17 TABLET | Refills: 0 | Status: SHIPPED | OUTPATIENT
Start: 2023-11-27

## 2023-12-13 ENCOUNTER — TELEMEDICINE (OUTPATIENT)
Dept: PSYCHIATRY | Facility: CLINIC | Age: 58
End: 2023-12-13
Payer: COMMERCIAL

## 2023-12-13 DIAGNOSIS — G47.00 INSOMNIA, UNSPECIFIED TYPE: ICD-10-CM

## 2023-12-13 DIAGNOSIS — F41.1 GENERALIZED ANXIETY DISORDER: ICD-10-CM

## 2023-12-13 DIAGNOSIS — F33.1 MAJOR DEPRESSIVE DISORDER, RECURRENT, MODERATE (HCC): ICD-10-CM

## 2023-12-13 PROCEDURE — 99214 OFFICE O/P EST MOD 30 MIN: CPT | Performed by: PSYCHIATRY & NEUROLOGY

## 2023-12-13 RX ORDER — GABAPENTIN 800 MG/1
800 TABLET ORAL 3 TIMES DAILY
Qty: 270 TABLET | Refills: 0 | Status: SHIPPED | OUTPATIENT
Start: 2023-12-13

## 2023-12-13 RX ORDER — BUSPIRONE HYDROCHLORIDE 15 MG/1
15 TABLET ORAL 3 TIMES DAILY
Qty: 270 TABLET | Refills: 0 | Status: SHIPPED | OUTPATIENT
Start: 2023-12-13

## 2023-12-13 RX ORDER — QUETIAPINE FUMARATE 300 MG/1
600 TABLET, FILM COATED ORAL
Qty: 180 TABLET | Refills: 0 | Status: SHIPPED | OUTPATIENT
Start: 2023-12-13

## 2023-12-13 RX ORDER — QUETIAPINE FUMARATE 100 MG/1
TABLET, FILM COATED ORAL
Qty: 180 TABLET | Refills: 0 | Status: SHIPPED | OUTPATIENT
Start: 2023-12-13

## 2023-12-13 RX ORDER — LAMOTRIGINE 25 MG/1
50 TABLET ORAL DAILY
Qty: 180 TABLET | Refills: 0 | Status: SHIPPED | OUTPATIENT
Start: 2023-12-13

## 2023-12-13 RX ORDER — MIRTAZAPINE 45 MG/1
45 TABLET, FILM COATED ORAL
Qty: 90 TABLET | Refills: 0 | Status: SHIPPED | OUTPATIENT
Start: 2023-12-13

## 2023-12-13 RX ORDER — CLONIDINE HYDROCHLORIDE 0.2 MG/1
0.2 TABLET ORAL
Qty: 90 TABLET | Refills: 0 | Status: SHIPPED | OUTPATIENT
Start: 2023-12-13

## 2023-12-13 RX ORDER — DULOXETIN HYDROCHLORIDE 60 MG/1
120 CAPSULE, DELAYED RELEASE ORAL DAILY
Qty: 180 CAPSULE | Refills: 0 | Status: SHIPPED | OUTPATIENT
Start: 2023-12-13

## 2023-12-13 RX ORDER — PROPRANOLOL HYDROCHLORIDE 10 MG/1
10 TABLET ORAL 2 TIMES DAILY PRN
Qty: 60 TABLET | Refills: 2 | Status: SHIPPED | OUTPATIENT
Start: 2023-12-13

## 2023-12-13 NOTE — PSYCH
Virtual Regular Visit    Verification of patient location:    Patient is located at Home in the following state in which I hold an active license [place]      Assessment/Plan:    Problem List Items Addressed This Visit    None      Goals addressed in session: Goal 1          Reason for visit is   Chief Complaint   Patient presents with    Medication Management        Encounter provider Junaid Hills MD    Provider located at [address] [place] [address],[address]  [address] [facility] [address] [place] [address]  [phone]      Recent Visits  No visits were found meeting these conditions. Showing recent visits within past 7 days and meeting all other requirements  Today's Visits  Date Type Provider Dept   12/13/23 Telemedicine Junaid Hills, [address] today's visits and meeting all other requirements  Future Appointments  No visits were found meeting these conditions. Showing future appointments within next 150 days and meeting all other requirements       The patient was identified by name and date of birth. Monalisa Montana was informed that this is a telemedicine visit and that the visit is being conducted [employer]. She agrees to proceed. .  My office door was closed. No one else was in the room. She acknowledged consent and understanding of privacy and security of the video platform. The patient has agreed to participate and understands they can discontinue the visit at any time. Patient is aware this is a billable service. Subjective  Monalisa Montana is a 60 y.o. female with depression, anxiety, insomnia presents for regular f/u  .   Compliant with meds, denies SE  Recovering from cold  Lives with father, works  In therapy 2 x week, meditation, yoga      HPI   Anxiety - " terrible", " panicky" - pt reports episodes of SOB, tachycardia, sweating, racing thoughts every morning  She takes propranolol - gets better by the time of work  Anticipating anxiety, gets overwhelmed easily  Mood - c/o low motivation, procrastination, " life is hard"; forces herself to start her day; gets better by afternoon  Past Medical History:   Diagnosis Date    Gastroparesis        History reviewed. No pertinent surgical history. Current Outpatient Medications   Medication Sig Dispense Refill    busPIRone (BUSPAR) 15 mg tablet Take 1 tablet (15 mg total) by mouth 3 (three) times a day 270 tablet 0    cloNIDine (CATAPRES) 0.2 mg tablet Take 1 tablet (0.2 mg total) by mouth daily at bedtime 90 tablet 0    DULoxetine (CYMBALTA) 60 mg delayed release capsule Take 2 capsules (120 mg total) by mouth daily 180 capsule 0    gabapentin (NEURONTIN) 800 mg tablet Take 1 tablet (800 mg total) by mouth 3 (three) times a day 270 tablet 0    lamoTRIgine (LaMICtal) 25 mg tablet Take 2 tablets (50 mg total) by mouth daily 60 tablet 0    mirtazapine (REMERON) 45 MG tablet Take 1 tablet (45 mg total) by mouth daily at bedtime 17 tablet 0    propranolol (INDERAL) 10 mg tablet Take 1 tablet (10 mg total) by mouth 2 (two) times a day as needed (anxiety) 60 tablet 2    QUEtiapine (SEROquel) 100 mg tablet Take 1-2 tabs with quetiapine 600 mg at bed time as needed for anxiety. 180 tablet 0    QUEtiapine (SEROquel) 300 mg tablet Take 2 tablets (600 mg total) by mouth daily at bedtime 180 tablet 0    ferrous gluconate (FERGON) 324 mg tablet Take 324 mg by mouth      pantoprazole (PROTONIX) 40 mg tablet Take 40 mg by mouth 2 (two) times a day      RA Vitamin B-12 TR 1000 MCG TBCR Take 1 tablet by mouth daily      Sodium Fluoride 5000 PPM 1.1 % PSTE 2 (two) times a day       No current facility-administered medications for this visit. Not on File    Review of Systems   Constitutional:  Negative for activity change and appetite change. Psychiatric/Behavioral:  Positive for dysphoric mood. Negative for sleep disturbance and suicidal ideas. The patient is nervous/anxious. Video Exam    There were no vitals filed for this visit. Physical Exam  Constitutional:       Appearance: Normal appearance. She is normal weight. Neurological:      Mental Status: She is alert. Psychiatric:         Attention and Perception: Attention and perception normal.         Mood and Affect: Affect normal. Mood is anxious and depressed. Speech: Speech normal.         Behavior: Behavior normal. Behavior is cooperative. Thought Content:  Thought content normal.         Judgment: Judgment normal.          Visit Time    Visit Start Time: 9.18 am   Visit Stop Time: 9.28 am   Total Visit Duration:  17 minutes

## 2024-02-28 DIAGNOSIS — G47.00 INSOMNIA, UNSPECIFIED TYPE: ICD-10-CM

## 2024-02-28 DIAGNOSIS — F41.1 GENERALIZED ANXIETY DISORDER: ICD-10-CM

## 2024-02-28 DIAGNOSIS — F33.1 MAJOR DEPRESSIVE DISORDER, RECURRENT, MODERATE (HCC): ICD-10-CM

## 2024-02-28 RX ORDER — MIRTAZAPINE 45 MG/1
45 TABLET, FILM COATED ORAL
Qty: 90 TABLET | Refills: 0 | Status: SHIPPED | OUTPATIENT
Start: 2024-02-28

## 2024-02-28 RX ORDER — QUETIAPINE FUMARATE 100 MG/1
TABLET, FILM COATED ORAL
Qty: 180 TABLET | Refills: 0 | Status: SHIPPED | OUTPATIENT
Start: 2024-02-28

## 2024-02-28 NOTE — TELEPHONE ENCOUNTER
Pt requested refills on the mirtazapine 45mg, and the quetiapine 100mg, said that she will not have enough until her next appt

## 2024-03-06 ENCOUNTER — TELEMEDICINE (OUTPATIENT)
Dept: PSYCHIATRY | Facility: CLINIC | Age: 59
End: 2024-03-06
Payer: COMMERCIAL

## 2024-03-06 DIAGNOSIS — F41.1 GENERALIZED ANXIETY DISORDER: ICD-10-CM

## 2024-03-06 DIAGNOSIS — G47.00 INSOMNIA, UNSPECIFIED TYPE: ICD-10-CM

## 2024-03-06 DIAGNOSIS — F33.1 MAJOR DEPRESSIVE DISORDER, RECURRENT, MODERATE (HCC): ICD-10-CM

## 2024-03-06 PROCEDURE — 99214 OFFICE O/P EST MOD 30 MIN: CPT | Performed by: PSYCHIATRY & NEUROLOGY

## 2024-03-06 RX ORDER — PROPRANOLOL HYDROCHLORIDE 10 MG/1
10 TABLET ORAL 2 TIMES DAILY PRN
Qty: 60 TABLET | Refills: 2 | Status: SHIPPED | OUTPATIENT
Start: 2024-03-06

## 2024-03-06 RX ORDER — DULOXETIN HYDROCHLORIDE 60 MG/1
120 CAPSULE, DELAYED RELEASE ORAL DAILY
Qty: 180 CAPSULE | Refills: 0 | Status: SHIPPED | OUTPATIENT
Start: 2024-03-06

## 2024-03-06 RX ORDER — CLONIDINE HYDROCHLORIDE 0.2 MG/1
0.2 TABLET ORAL
Qty: 90 TABLET | Refills: 0 | Status: SHIPPED | OUTPATIENT
Start: 2024-03-06

## 2024-03-06 RX ORDER — GABAPENTIN 800 MG/1
800 TABLET ORAL 3 TIMES DAILY
Qty: 270 TABLET | Refills: 0 | Status: SHIPPED | OUTPATIENT
Start: 2024-03-06

## 2024-03-06 RX ORDER — MIRTAZAPINE 45 MG/1
45 TABLET, FILM COATED ORAL
Qty: 90 TABLET | Refills: 0 | Status: SHIPPED | OUTPATIENT
Start: 2024-03-06

## 2024-03-06 RX ORDER — BUSPIRONE HYDROCHLORIDE 15 MG/1
15 TABLET ORAL 3 TIMES DAILY
Qty: 270 TABLET | Refills: 0 | Status: SHIPPED | OUTPATIENT
Start: 2024-03-06

## 2024-03-06 RX ORDER — QUETIAPINE FUMARATE 300 MG/1
600 TABLET, FILM COATED ORAL
Qty: 180 TABLET | Refills: 0 | Status: SHIPPED | OUTPATIENT
Start: 2024-03-06

## 2024-03-06 RX ORDER — LAMOTRIGINE 25 MG/1
50 TABLET ORAL DAILY
Qty: 180 TABLET | Refills: 0 | Status: SHIPPED | OUTPATIENT
Start: 2024-03-06

## 2024-03-06 NOTE — PSYCH
Virtual Regular Visit    Verification of patient location:    Patient is located at Home in the following state in which I hold an active license [place]      Assessment/Plan:    Problem List Items Addressed This Visit    None      Goals addressed in session: Goal 1          Reason for visit is   Chief Complaint   Patient presents with    Medication Management        Encounter provider Cori Jesus MD    Provider located at [facility]  [address]  [facility] [id number] [phone]      Recent Visits  No visits were found meeting these conditions.  Showing recent visits within past 7 days and meeting all other requirements  Today's Visits  Date Type Provider Dept   03/06/24 Telemedicine Cori Jesus MD [facility]   Showing today's visits and meeting all other requirements  Future Appointments  No visits were found meeting these conditions.  Showing future appointments within next 150 days and meeting all other requirements       The patient was identified by name and date of birth. Cassy Peraza was informed that this is a telemedicine visit and that the visit is being conducted thr[employer] Emb[employer]ded platform. She agrees to proceed..  My office door was closed. No one else was in the room.  She acknowledged consent and understanding of privacy and security of the video platform. The patient has agreed to participate and understands they can discontinue the visit at any time.    Patient is aware this is a billable service.     Subjective  Cassy Peraza is a 59 y.o. female with depression, anxiety, insomnia presents for regular f/u  .  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 Pt is active, social,  "functions at baseline  Anxiety - continues to c/o episodes of anxiety around 1-3 pm daily - racing thoughts, tachycardia, feeling \" anxious\"  Propranolol and seroquel 100 mg helps  Sleep - 5-6 hrs; \" not enough\"; sleeps either 7 pm - 1 am or 9 pm - 3 am ( depending on work schedule)  Past Medical History:   Diagnosis Date    Gastroparesis        History reviewed. No pertinent surgical history.    Current Outpatient Medications   Medication Sig Dispense Refill    busPIRone (BUSPAR) 15 mg tablet Take 1 tablet (15 mg total) by mouth 3 (three) times a day 270 tablet 0    DULoxetine (CYMBALTA) 60 mg delayed release capsule Take 2 capsules (120 mg total) by mouth daily 180 capsule 0    gabapentin (NEURONTIN) 800 mg tablet Take 1 tablet (800 mg total) by mouth 3 (three) times a day 270 tablet 0    lamoTRIgine (LaMICtal) 25 mg tablet Take 2 tablets (50 mg total) by mouth daily 180 tablet 0    mirtazapine (REMERON) 45 MG tablet Take 1 tablet (45 mg total) by mouth daily at bedtime 90 tablet 0    propranolol (INDERAL) 10 mg tablet Take 1 tablet (10 mg total) by mouth 2 (two) times a day as needed (anxiety) 60 tablet 2    QUEtiapine (SEROquel) 100 mg tablet Take 1-2 tabs with quetiapine 600 mg at bed time as needed for anxiety. 180 tablet 0    QUEtiapine (SEROquel) 300 mg tablet Take 2 tablets (600 mg total) by mouth daily at bedtime 180 tablet 0    cloNIDine (CATAPRES) 0.2 mg tablet Take 1 tablet (0.2 mg total) by mouth daily at bedtime 90 tablet 0    ferrous gluconate (FERGON) 324 mg tablet Take 324 mg by mouth      pantoprazole (PROTONIX) 40 mg tablet Take 40 mg by mouth 2 (two) times a day      RA Vitamin B-12 TR 1000 MCG TBCR Take 1 tablet by mouth daily      Sodium Fluoride 5000 PPM 1.1 % PSTE 2 (two) times a day       No current facility-administered medications for this visit.        Not on File    Review of Systems   Constitutional:  Negative for activity change and appetite change.   Psychiatric/Behavioral:  Positive " for sleep disturbance. Negative for dysphoric mood and suicidal ideas. The patient is nervous/anxious.        Video Exam    There were no vitals filed for this visit.    Physical Exam  Constitutional:       Appearance: Normal appearance. She is normal weight.   Neurological:      Mental Status: She is alert.   Psychiatric:         Attention and Perception: Attention and perception normal.         Mood and Affect: Mood is anxious. Affect is blunt.         Speech: Speech normal.         Behavior: Behavior normal. Behavior is cooperative.         Thought Content: Thought content normal.         Judgment: Judgment normal.          Visit Time    Visit Start Time: 9.27 am   Visit Stop Time: 9.37 am   Total Visit Duration:  20 minutes  TREATMENT PLAN (Medication Management Only)        [facility]    Name and Date of Birth:  Cassy Peraza 59 y.o. 1965  Date of Treatment Plan: March 6, 2024  Diagnosis/Diagnoses:  No diagnosis found.  Strengths/Personal Resources for Self-Care: taking medications as prescribed, motivation for treatment.  Area/Areas of need (in own words): anxiety, depression  1. Long Term Goal: continue improvement in anxiety.  Target Date:6 months - 9/6/2024  Person/Persons responsible for completion of goal: Cassy  2.  Short Term Objective (s) - How will we reach this goal?:   A. Provider new recommended medication/dosage changes and/or continue medication(s): continue current medications as prescribed Cymbalta, Remeron, Lamictal, Buspar, Propranolol.  B. N/A.  C. N/A.  Target Date:6 months - 9/6/2024  Person/Persons Responsible for Completion of Goal: Cassy  Progress Towards Goals: continuing treatment  Treatment Modality: medication management every 3 months  Review due 180 days from date of this plan: 6 months - 9/6/2024  Expected length of service: ongoing treatment  My Physician/PA/NP and I have developed this plan together and I agree to work  on the goals and objectives. I understand the treatment goals that were developed for my treatment.

## 2024-03-06 NOTE — PATIENT INSTRUCTIONS
Continue current meds  Take propranolol or seroquel 100 mg at 1 pm   Continue other meds; takes neurontin/ seroquel/Remeron hs

## 2024-05-16 DIAGNOSIS — F41.1 GENERALIZED ANXIETY DISORDER: ICD-10-CM

## 2024-05-16 DIAGNOSIS — F33.1 MAJOR DEPRESSIVE DISORDER, RECURRENT, MODERATE (HCC): ICD-10-CM

## 2024-05-16 DIAGNOSIS — G47.00 INSOMNIA, UNSPECIFIED TYPE: ICD-10-CM

## 2024-05-16 RX ORDER — MIRTAZAPINE 45 MG/1
45 TABLET, FILM COATED ORAL
Qty: 10 TABLET | Refills: 0 | Status: SHIPPED | OUTPATIENT
Start: 2024-05-16

## 2024-05-16 RX ORDER — QUETIAPINE FUMARATE 300 MG/1
600 TABLET, FILM COATED ORAL
Qty: 20 TABLET | Refills: 0 | Status: SHIPPED | OUTPATIENT
Start: 2024-05-16

## 2024-05-16 RX ORDER — CLONIDINE HYDROCHLORIDE 0.2 MG/1
0.2 TABLET ORAL
Qty: 10 TABLET | Refills: 0 | Status: SHIPPED | OUTPATIENT
Start: 2024-05-16

## 2024-05-16 RX ORDER — QUETIAPINE FUMARATE 100 MG/1
TABLET, FILM COATED ORAL
Qty: 20 TABLET | Refills: 0 | Status: SHIPPED | OUTPATIENT
Start: 2024-05-16

## 2024-05-16 NOTE — TELEPHONE ENCOUNTER
Patient is on vacation for the next 10 days and forgot her night time medication at home. She is requesting 10 day supply be sent to [facility] [phone]

## 2024-05-28 DIAGNOSIS — F33.1 MAJOR DEPRESSIVE DISORDER, RECURRENT, MODERATE (HCC): ICD-10-CM

## 2024-05-28 DIAGNOSIS — F41.1 GENERALIZED ANXIETY DISORDER: ICD-10-CM

## 2024-05-28 DIAGNOSIS — G47.00 INSOMNIA, UNSPECIFIED TYPE: ICD-10-CM

## 2024-05-28 RX ORDER — QUETIAPINE FUMARATE 300 MG/1
600 TABLET, FILM COATED ORAL
Qty: 20 TABLET | Refills: 0 | Status: SHIPPED | OUTPATIENT
Start: 2024-05-28 | End: 2024-06-05 | Stop reason: SDUPTHER

## 2024-05-28 RX ORDER — QUETIAPINE FUMARATE 100 MG/1
TABLET, FILM COATED ORAL
Qty: 20 TABLET | Refills: 0 | Status: SHIPPED | OUTPATIENT
Start: 2024-05-28 | End: 2024-06-05 | Stop reason: SDUPTHER

## 2024-05-28 RX ORDER — MIRTAZAPINE 45 MG/1
45 TABLET, FILM COATED ORAL
Qty: 10 TABLET | Refills: 0 | Status: SHIPPED | OUTPATIENT
Start: 2024-05-28 | End: 2024-06-05 | Stop reason: SDUPTHER

## 2024-05-28 NOTE — TELEPHONE ENCOUNTER
Patient unable to find nighttime medication once home from vacation. Unsure what happened to them. Knows she will have to pay out of pocket. Has appt next week. Requesting meds to hold over until appt with exception of clonidine as she does have that one.. Suspects medication may have been accidentally thrown away but just isnt what happened.     Additionally, patient reports continued trouble falling asleep at night, even with nighttime medication. States she winds up laying there for 2 hours trying to fall asleep which makes her have to go to bed at 7:00pm because she knows its going to take 2 hours to fall asleep.     Made pt aware that Dr. Jesus may not be able to make med change between now and appt as she may need to evaluate and discuss situation. Patient expressed understanding.

## 2024-06-05 ENCOUNTER — TELEMEDICINE (OUTPATIENT)
Dept: PSYCHIATRY | Facility: CLINIC | Age: 59
End: 2024-06-05
Payer: COMMERCIAL

## 2024-06-05 DIAGNOSIS — F41.1 GENERALIZED ANXIETY DISORDER: ICD-10-CM

## 2024-06-05 DIAGNOSIS — G47.00 INSOMNIA, UNSPECIFIED TYPE: ICD-10-CM

## 2024-06-05 DIAGNOSIS — F33.1 MAJOR DEPRESSIVE DISORDER, RECURRENT, MODERATE (HCC): Primary | ICD-10-CM

## 2024-06-05 PROCEDURE — 99214 OFFICE O/P EST MOD 30 MIN: CPT | Performed by: PSYCHIATRY & NEUROLOGY

## 2024-06-05 RX ORDER — PROPRANOLOL HYDROCHLORIDE 10 MG/1
10 TABLET ORAL 2 TIMES DAILY PRN
Qty: 60 TABLET | Refills: 2 | Status: SHIPPED | OUTPATIENT
Start: 2024-06-05

## 2024-06-05 RX ORDER — ALBUTEROL SULFATE 90 UG/1
AEROSOL, METERED RESPIRATORY (INHALATION)
COMMUNITY
Start: 2024-06-03

## 2024-06-05 RX ORDER — FLUTICASONE PROPIONATE AND SALMETEROL 250; 50 UG/1; UG/1
1 POWDER RESPIRATORY (INHALATION) 2 TIMES DAILY
COMMUNITY

## 2024-06-05 RX ORDER — INDOMETHACIN 50 MG/1
CAPSULE ORAL
COMMUNITY
Start: 2024-03-27

## 2024-06-05 RX ORDER — BUSPIRONE HYDROCHLORIDE 15 MG/1
15 TABLET ORAL 3 TIMES DAILY
Qty: 270 TABLET | Refills: 0 | Status: SHIPPED | OUTPATIENT
Start: 2024-06-05

## 2024-06-05 RX ORDER — MIRTAZAPINE 45 MG/1
45 TABLET, FILM COATED ORAL
Qty: 90 TABLET | Refills: 0 | Status: SHIPPED | OUTPATIENT
Start: 2024-06-05

## 2024-06-05 RX ORDER — QUETIAPINE FUMARATE 300 MG/1
600 TABLET, FILM COATED ORAL
Qty: 180 TABLET | Refills: 0 | Status: SHIPPED | OUTPATIENT
Start: 2024-06-05

## 2024-06-05 RX ORDER — GABAPENTIN 800 MG/1
800 TABLET ORAL 3 TIMES DAILY
Qty: 270 TABLET | Refills: 0 | Status: SHIPPED | OUTPATIENT
Start: 2024-06-05

## 2024-06-05 RX ORDER — CLONIDINE HYDROCHLORIDE 0.2 MG/1
0.2 TABLET ORAL
Qty: 90 TABLET | Refills: 0 | Status: SHIPPED | OUTPATIENT
Start: 2024-06-05

## 2024-06-05 RX ORDER — CELECOXIB 200 MG/1
200 CAPSULE ORAL DAILY
COMMUNITY

## 2024-06-05 RX ORDER — QUETIAPINE FUMARATE 100 MG/1
TABLET, FILM COATED ORAL
Qty: 180 TABLET | Refills: 0 | Status: SHIPPED | OUTPATIENT
Start: 2024-06-05

## 2024-06-05 RX ORDER — LAMOTRIGINE 25 MG/1
50 TABLET ORAL DAILY
Qty: 180 TABLET | Refills: 0 | Status: SHIPPED | OUTPATIENT
Start: 2024-06-05

## 2024-06-05 RX ORDER — DULOXETIN HYDROCHLORIDE 60 MG/1
120 CAPSULE, DELAYED RELEASE ORAL DAILY
Qty: 180 CAPSULE | Refills: 0 | Status: SHIPPED | OUTPATIENT
Start: 2024-06-05

## 2024-06-05 NOTE — PSYCH
"  Virtual Regular Visit    Verification of patient location:    Patient is located at Other in the following state in which I hold an active license [place]      Assessment/Plan:    Problem List Items Addressed This Visit    None      Goals addressed in session: Goal 1          Reason for visit is   Chief Complaint   Patient presents with    Medication Management        Encounter provider Cori Jesus MD      Recent Visits  No visits were found meeting these conditions.  Showing recent visits within past 7 days and meeting all other requirements  Today's Visits  Date Type Provider Dept   06/05/24 Telemedicine Cori Jesus MD [facility]   Showing today's visits and meeting all other requirements  Future Appointments  No visits were found meeting these conditions.  Showing future appointments within next 150 days and meeting all other requirements       The patient was identified by name and date of birth. Cassy Peraza was informed that this is a telemedicine visit and that the visit is being conducted throughthe [employer] Embedded platform. She agrees to proceed..  My office door was closed. No one else was in the room.  She acknowledged consent and understanding of privacy and security of the video platform. The patient has agreed to participate and understands they can discontinue the visit at any time.    Patient is aware this is a billable service.     Subjective  Cassy Peraza is a 59 y.o. female with depression, anxiety, insomnia presents for regular f/u  .  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 Propranolol prn helps  Sometimes reports anxiety in the morning   Sleep - 6-7 hrs on meds; 3-4 times a week wakes up in the middle of the night and is unable to fall back asleep    Past Medical History:   Diagnosis Date    Gastroparesis        History reviewed. No pertinent surgical history.    Current Outpatient Medications   Medication Sig Dispense Refill    busPIRone (BUSPAR) 15 mg tablet Take 1 tablet (15 mg total) by mouth 3 (three) times a day 270 tablet 0    celecoxib (CeleBREX) 200 mg capsule Take 200 mg by mouth daily      cloNIDine (CATAPRES) 0.2 mg tablet Take 1 tablet (0.2 mg total) by mouth daily at bedtime 10 tablet 0    DULoxetine (CYMBALTA) 60 mg delayed release capsule Take 2 capsules (120 mg total) by mouth daily 180 capsule 0    gabapentin (NEURONTIN) 800 mg tablet Take 1 tablet (800 mg total) by mouth 3 (three) times a day 270 tablet 0    indomethacin (INDOCIN) 50 mg capsule TAKE 1 CAPSULE orally twice a day; Administer with food or milk, ...  (REFER TO PRESCRIPTION NOTES).      lamoTRIgine (LaMICtal) 25 mg tablet Take 2 tablets (50 mg total) by mouth daily 180 tablet 0    mirtazapine (REMERON) 45 MG tablet Take 1 tablet (45 mg total) by mouth daily at bedtime 10 tablet 0    propranolol (INDERAL) 10 mg tablet Take 1 tablet (10 mg total) by mouth 2 (two) times a day as needed (anxiety) 60 tablet 2    QUEtiapine (SEROquel) 100 mg tablet Take 1-2 tabs with quetiapine 600 mg at bed time as needed for anxiety. 20 tablet 0    QUEtiapine (SEROquel) 300 mg tablet Take 2 tablets (600 mg total) by mouth daily at bedtime 20 tablet 0    albuterol (PROVENTIL HFA,VENTOLIN HFA) 90 mcg/act inhaler TAKE 2 PUFFS EVERY 6 HOURS (COUGH) FOR 30 DAYS      ferrous gluconate (FERGON) 324 mg tablet Take 324 mg by mouth      Fluticasone-Salmeterol (Advair) 250-50 mcg/dose inhaler Inhale 1 puff 2 (two) times a day      pantoprazole (PROTONIX) 40 mg tablet Take 40 mg by mouth 2 (two) times a day       RA Vitamin B-12 TR 1000 MCG TBCR Take 1 tablet by mouth daily      Sodium Fluoride 5000 PPM 1.1 % PSTE 2 (two) times a day       No current facility-administered medications for this visit.        Not on File    Review of Systems   Constitutional:  Negative for activity change and appetite change.   Respiratory:          Recovered from pneumonia   Psychiatric/Behavioral:  Negative for dysphoric mood, sleep disturbance and suicidal ideas. The patient is nervous/anxious.        Video Exam    There were no vitals filed for this visit.    Physical Exam  Constitutional:       Appearance: Normal appearance. She is normal weight.   Neurological:      Mental Status: She is alert.   Psychiatric:         Attention and Perception: Attention and perception normal.         Mood and Affect: Mood is anxious. Mood is not depressed. Affect is blunt.         Speech: Speech normal.         Behavior: Behavior is cooperative.         Thought Content: Thought content normal.         Judgment: Judgment normal.          Visit Time    Visit Start Time: 9.28 am   Visit Stop Time: 9.39 am   Total Visit Duration:  25 minutes

## 2024-08-14 ENCOUNTER — TELEPHONE (OUTPATIENT)
Age: 59
End: 2024-08-14

## 2024-08-14 NOTE — TELEPHONE ENCOUNTER
Patient contacted the Rx Refill Line.    Manuela that she was having issues with her medication that are supposed to help her sleep at night. Stated it was discussed at her last appointment, but it has not gotten any better.    Advised patient that I would be sending a message to the office to reach out to her in regards to this issue.    Phone #: [phone]

## 2024-08-15 ENCOUNTER — TELEPHONE (OUTPATIENT)
Dept: PSYCHIATRY | Facility: CLINIC | Age: 59
End: 2024-08-15

## 2024-08-15 NOTE — TELEPHONE ENCOUNTER
Received call from Cassy requesting that provider review her medications.  She is currently taking Remeron, Clonidine, and 600 MG of Seroquel at night.  States within 4 hours, she is awake and then takes all 3 of those medications again.  She cannot sleep if she does not.  Informed her that is not a good thing to do and it is too much medication. However, she said if she don't do that, she will not sleep.  She has to go to work and watch her grandchildren so she needs her sleep.  She asked about Ambien and I informed her I will refer to provider however since Ambien is a controlled substance other medications are usually tried first.  Has follow up with provider on 8/28.    Will refer to Dr Jesus for review.

## 2024-08-19 ENCOUNTER — TELEPHONE (OUTPATIENT)
Age: 59
End: 2024-08-19

## 2024-08-20 ENCOUNTER — TELEPHONE (OUTPATIENT)
Age: 59
End: 2024-08-20

## 2024-08-20 ENCOUNTER — TELEPHONE (OUTPATIENT)
Dept: PSYCHIATRY | Facility: CLINIC | Age: 59
End: 2024-08-20

## 2024-08-20 NOTE — TELEPHONE ENCOUNTER
Writer called client to schedule an earlier appointment with Dr. Jesus per provider's request after client reported concerns regarding medication.    Client did not answer. Writer left message regarding opening on 8/21/24; however, informed client that the appointment cannot be saved for her without contact and advised that Dr. Jesus's schedule books fast so she should call back as soon as possible to try to get it before it gets booked.     Otherwise, at time of call no other openings prior to client's next scheduled appointment with Dr. Jesus.

## 2024-08-20 NOTE — TELEPHONE ENCOUNTER
Pt returned call asking for 8/21/22 appt at 10am. Writer informed her of message from MR and that the appt was actually already booked. Pt will keep appt on 8/28/24. Writer saw an encounter in system for provider regarding sleep issues. Writer put pt on cancellation list to be called if there are any spots that open up.

## 2024-08-26 ENCOUNTER — TELEPHONE (OUTPATIENT)
Age: 59
End: 2024-08-26

## 2024-08-26 DIAGNOSIS — F41.1 GENERALIZED ANXIETY DISORDER: ICD-10-CM

## 2024-08-26 DIAGNOSIS — F33.1 MAJOR DEPRESSIVE DISORDER, RECURRENT, MODERATE (HCC): ICD-10-CM

## 2024-08-26 NOTE — TELEPHONE ENCOUNTER
Patient called to request a refill for their SEROquel 100 mg & SEROquel 300 mg  advised a refill was requested on 08/26/2024 and is pending approval. Patient verbalized understanding and is in agreement.        Patient stated she is completely out of her medication and needs it filled as soon as possible.

## 2024-08-26 NOTE — TELEPHONE ENCOUNTER
Patient called to request a refill for their SEROQUEL 300 & 100 advised a refill was requested on 08/26/24 and is pending approval. Patient verbalized understanding and is in agreement.

## 2024-08-27 DIAGNOSIS — F33.1 MAJOR DEPRESSIVE DISORDER, RECURRENT, MODERATE (HCC): ICD-10-CM

## 2024-08-27 DIAGNOSIS — F41.1 GENERALIZED ANXIETY DISORDER: ICD-10-CM

## 2024-08-27 RX ORDER — QUETIAPINE FUMARATE 100 MG/1
TABLET, FILM COATED ORAL
Qty: 180 TABLET | Refills: 0 | OUTPATIENT
Start: 2024-08-27

## 2024-08-27 RX ORDER — QUETIAPINE FUMARATE 300 MG/1
600 TABLET, FILM COATED ORAL
Qty: 180 TABLET | Refills: 0 | OUTPATIENT
Start: 2024-08-27

## 2024-08-28 ENCOUNTER — TELEPHONE (OUTPATIENT)
Dept: PSYCHIATRY | Facility: CLINIC | Age: 59
End: 2024-08-28

## 2024-08-28 ENCOUNTER — TELEMEDICINE (OUTPATIENT)
Dept: PSYCHIATRY | Facility: CLINIC | Age: 59
End: 2024-08-28
Payer: COMMERCIAL

## 2024-08-28 DIAGNOSIS — F33.1 MAJOR DEPRESSIVE DISORDER, RECURRENT, MODERATE (HCC): Primary | ICD-10-CM

## 2024-08-28 DIAGNOSIS — G47.00 INSOMNIA, UNSPECIFIED TYPE: ICD-10-CM

## 2024-08-28 DIAGNOSIS — F41.1 GENERALIZED ANXIETY DISORDER: ICD-10-CM

## 2024-08-28 PROCEDURE — 99214 OFFICE O/P EST MOD 30 MIN: CPT | Performed by: PSYCHIATRY & NEUROLOGY

## 2024-08-28 RX ORDER — BUSPIRONE HYDROCHLORIDE 15 MG/1
15 TABLET ORAL 3 TIMES DAILY
Qty: 270 TABLET | Refills: 0 | Status: SHIPPED | OUTPATIENT
Start: 2024-08-28

## 2024-08-28 RX ORDER — QUETIAPINE FUMARATE 100 MG/1
TABLET, FILM COATED ORAL
Qty: 180 TABLET | Refills: 0 | Status: SHIPPED | OUTPATIENT
Start: 2024-08-28

## 2024-08-28 RX ORDER — GABAPENTIN 800 MG/1
800 TABLET ORAL 3 TIMES DAILY
Qty: 270 TABLET | Refills: 0 | Status: SHIPPED | OUTPATIENT
Start: 2024-08-28

## 2024-08-28 RX ORDER — LAMOTRIGINE 25 MG/1
50 TABLET ORAL DAILY
Qty: 180 TABLET | Refills: 0 | Status: SHIPPED | OUTPATIENT
Start: 2024-08-28

## 2024-08-28 RX ORDER — CLONIDINE HYDROCHLORIDE 0.2 MG/1
0.2 TABLET ORAL
Qty: 90 TABLET | Refills: 0 | Status: SHIPPED | OUTPATIENT
Start: 2024-08-28

## 2024-08-28 RX ORDER — QUETIAPINE FUMARATE 300 MG/1
600 TABLET, FILM COATED ORAL
Qty: 180 TABLET | Refills: 0 | Status: SHIPPED | OUTPATIENT
Start: 2024-08-28

## 2024-08-28 RX ORDER — DULOXETIN HYDROCHLORIDE 60 MG/1
120 CAPSULE, DELAYED RELEASE ORAL DAILY
Qty: 180 CAPSULE | Refills: 0 | Status: SHIPPED | OUTPATIENT
Start: 2024-08-28

## 2024-08-28 RX ORDER — LAMOTRIGINE 25 MG/1
50 TABLET ORAL DAILY
Qty: 180 TABLET | Refills: 0 | Status: SHIPPED | OUTPATIENT
Start: 2024-08-28 | End: 2024-08-28 | Stop reason: SDUPTHER

## 2024-08-28 RX ORDER — MIRTAZAPINE 45 MG/1
45 TABLET, FILM COATED ORAL
Qty: 90 TABLET | Refills: 0 | Status: SHIPPED | OUTPATIENT
Start: 2024-08-28

## 2024-08-28 RX ORDER — BUSPIRONE HYDROCHLORIDE 15 MG/1
15 TABLET ORAL 3 TIMES DAILY
Qty: 270 TABLET | Refills: 0 | Status: SHIPPED | OUTPATIENT
Start: 2024-08-28 | End: 2024-08-28 | Stop reason: SDUPTHER

## 2024-08-28 RX ORDER — PROPRANOLOL HYDROCHLORIDE 10 MG/1
10 TABLET ORAL 2 TIMES DAILY PRN
Qty: 60 TABLET | Refills: 2 | Status: SHIPPED | OUTPATIENT
Start: 2024-08-28

## 2024-08-28 RX ORDER — HYDROXYZINE HYDROCHLORIDE 10 MG/1
10 TABLET, FILM COATED ORAL
Qty: 30 TABLET | Refills: 1 | Status: SHIPPED | OUTPATIENT
Start: 2024-08-28

## 2024-08-28 NOTE — TELEPHONE ENCOUNTER
Called and left message for client offering follow-up medication check appt. with Dr. Cori Jesus on 10/30 at 9:00 am.

## 2024-08-28 NOTE — PATIENT INSTRUCTIONS
Contiue current meds; take seroquel 8 pm , remeron at 1 am   Sleep hygiene d/w pt  The risk of respiratory depression explained to the pt; recommended not to take double meds doses  Add atarax 10 mg hs prn

## 2024-08-28 NOTE — PSYCH
Virtual Regular Visit    Verification of patient location:    Patient is located at Home in the following state in which I hold an active license [place]      Assessment/Plan:    Problem List Items Addressed This Visit    None      Goals addressed in session: Goal 1          Reason for visit is   Chief Complaint   Patient presents with    Medication Management        Encounter provider Cori Jesus MD      Recent Visits  No visits were found meeting these conditions.  Showing recent visits within past 7 days and meeting all other requirements  Today's Visits  Date Type Provider Dept   08/28/24 Telemedicine Cori Jesus MD [facility]   Showing today's visits and meeting all other requirements  Future Appointments  No visits were found meeting these conditions.  Showing future appointments within next 150 days and meeting all other requirements       The patient was identified by name and date of birth. Cassy Peraza was informed that this is a telemedicine visit and that the visit is being conducted throughthe Epic Embedded platform. She agrees to proceed..  My office door was closed. No one else was in the room.  She acknowledged consent and understanding of privacy and security of the video platform. The patient has agreed to participate and understands they can discontinue the visit at any time.    Patient is aware this is a billable service.     Subjective  Cassy Peraza is a 59 y.o. adult with depression, anxiety, insomnia presents for regular f/u  .  Compliant with meds, but sometimes takes a second dose of seroquel and Remeron at night; ran out of meds supply  Denies SE  Denies acute medical problems  Works 6 days x week; granddaughter started   HPI   Mood - mostly stable; not depressed; does ADLs; active and social ; feels tired in the morning  Anxiety - increased worries about not sleeping  Sleep - 4-5 hrs; unable to fall back asleep; every night  Pt watches TV 6-8 pm; goes to bed at 8  pm and wakes up at midnight; sometimes takes second meds dose  Pt takes cymbalta, neurontin, buspar at dinner time; seroquel, clonidine and remeron at 8 pm   Past Medical History:   Diagnosis Date    Gastroparesis        History reviewed. No pertinent surgical history.    Current Outpatient Medications   Medication Sig Dispense Refill    albuterol (PROVENTIL HFA,VENTOLIN HFA) 90 mcg/act inhaler TAKE 2 PUFFS EVERY 6 HOURS (COUGH) FOR 30 DAYS      busPIRone (BUSPAR) 15 mg tablet take 1 tablet by mouth three times a day 270 tablet 0    celecoxib (CeleBREX) 200 mg capsule Take 200 mg by mouth daily      cloNIDine (CATAPRES) 0.2 mg tablet Take 1 tablet (0.2 mg total) by mouth daily at bedtime 90 tablet 0    DULoxetine (CYMBALTA) 60 mg delayed release capsule Take 2 capsules (120 mg total) by mouth daily 180 capsule 0    ferrous gluconate (FERGON) 324 mg tablet Take 324 mg by mouth      Fluticasone-Salmeterol (Advair) 250-50 mcg/dose inhaler Inhale 1 puff 2 (two) times a day      gabapentin (NEURONTIN) 800 mg tablet Take 1 tablet (800 mg total) by mouth 3 (three) times a day 270 tablet 0    indomethacin (INDOCIN) 50 mg capsule TAKE 1 CAPSULE orally twice a day; Administer with food or milk, ...  (REFER TO PRESCRIPTION NOTES).      lamoTRIgine (LaMICtal) 25 mg tablet take 2 tablets by mouth once daily 180 tablet 0    mirtazapine (REMERON) 45 MG tablet Take 1 tablet (45 mg total) by mouth daily at bedtime 90 tablet 0    pantoprazole (PROTONIX) 40 mg tablet Take 40 mg by mouth 2 (two) times a day      propranolol (INDERAL) 10 mg tablet Take 1 tablet (10 mg total) by mouth 2 (two) times a day as needed (anxiety) 60 tablet 2    QUEtiapine (SEROquel) 100 mg tablet Take 1-2 tabs with quetiapine 600 mg at bed time as needed for anxiety. 180 tablet 0    QUEtiapine (SEROquel) 300 mg tablet Take 2 tablets (600 mg total) by mouth daily at bedtime 180 tablet 0    RA Vitamin B-12 TR 1000 MCG TBCR Take 1 tablet by mouth daily       Sodium Fluoride 5000 PPM 1.1 % PSTE 2 (two) times a day       No current facility-administered medications for this visit.        Not on File    Review of Systems   Constitutional:  Negative for activity change and appetite change.   Psychiatric/Behavioral:  Positive for sleep disturbance. Negative for dysphoric mood and suicidal ideas. The patient is nervous/anxious.        Video Exam    There were no vitals filed for this visit.    Physical Exam  Constitutional:       Appearance: Normal appearance. She is normal weight.   Neurological:      Mental Status: She is alert.   Psychiatric:         Attention and Perception: Attention and perception normal.         Mood and Affect: Mood is anxious. Mood is not depressed. Affect is blunt.         Speech: Speech normal.         Behavior: Behavior normal. Behavior is cooperative.         Thought Content: Thought content normal.         Judgment: Judgment normal.          Visit Time    Visit Start Time: 8.57 am   Visit Stop Time: 9.12 am   Total Visit Duration:  27 minutes    TREATMENT PLAN (Medication Management Only)        [facility]    Name and Date of Birth:  Cassy Peraza 59 y.o. 1965  Date of Treatment Plan: August 28, 2024  Diagnosis/Diagnoses:    1. Major depressive disorder, recurrent, moderate (HCC)    2. Generalized anxiety disorder    3. Insomnia, unspecified type      Strengths/Personal Resources for Self-Care: taking medications as prescribed, motivation for treatment.  Area/Areas of need (in own words): anxiety, depression, sleep problems  1. Long Term Goal: improvesleep.  Target Date:6 months - 2/28/2025  Person/Persons responsible for completion of goal:  patient  2.  Short Term Objective (s) - How will we reach this goal?:   A. Provider new recommended medication/dosage changes and/or continue medication(s): continue current medications as prescribed Cymbalta, Remeron, Neurontin, Seroquel, Buspar.  B.  N/A.  C. N/A.  Target Date:6 months - 2/28/2025  Person/Persons Responsible for Completion of Goal: Cassy  Progress Towards Goals: continuing treatment  Treatment Modality: medication management every 2 months  Review due 180 days from date of this plan: 6 months - 2/28/2025  Expected length of service: ongoing treatment  My Physician/PA/NP and I have developed this plan together and I agree to work on the goals and objectives. I understand the treatment goals that were developed for my treatment.

## 2024-09-25 DIAGNOSIS — F41.1 GENERALIZED ANXIETY DISORDER: ICD-10-CM

## 2024-09-25 RX ORDER — PROPRANOLOL HCL 10 MG
10 TABLET ORAL 2 TIMES DAILY PRN
Qty: 180 TABLET | Refills: 0 | Status: SHIPPED | OUTPATIENT
Start: 2024-09-25

## 2024-10-30 ENCOUNTER — TELEMEDICINE (OUTPATIENT)
Dept: PSYCHIATRY | Facility: CLINIC | Age: 59
End: 2024-10-30
Payer: COMMERCIAL

## 2024-10-30 DIAGNOSIS — G47.00 INSOMNIA, UNSPECIFIED TYPE: ICD-10-CM

## 2024-10-30 DIAGNOSIS — F41.1 GENERALIZED ANXIETY DISORDER: ICD-10-CM

## 2024-10-30 DIAGNOSIS — E88.810 METABOLIC SYNDROME: ICD-10-CM

## 2024-10-30 DIAGNOSIS — F33.1 MAJOR DEPRESSIVE DISORDER, RECURRENT, MODERATE (HCC): Primary | ICD-10-CM

## 2024-10-30 PROCEDURE — 99214 OFFICE O/P EST MOD 30 MIN: CPT | Performed by: PSYCHIATRY & NEUROLOGY

## 2024-10-30 RX ORDER — BUSPIRONE HYDROCHLORIDE 15 MG/1
15 TABLET ORAL 3 TIMES DAILY
Qty: 270 TABLET | Refills: 0 | Status: SHIPPED | OUTPATIENT
Start: 2024-10-30

## 2024-10-30 RX ORDER — GABAPENTIN 800 MG/1
800 TABLET ORAL 3 TIMES DAILY
Qty: 270 TABLET | Refills: 0 | Status: SHIPPED | OUTPATIENT
Start: 2024-10-30

## 2024-10-30 RX ORDER — DULOXETIN HYDROCHLORIDE 60 MG/1
120 CAPSULE, DELAYED RELEASE ORAL DAILY
Qty: 180 CAPSULE | Refills: 0 | Status: SHIPPED | OUTPATIENT
Start: 2024-10-30

## 2024-10-30 RX ORDER — LAMOTRIGINE 25 MG/1
50 TABLET ORAL DAILY
Qty: 180 TABLET | Refills: 0 | Status: SHIPPED | OUTPATIENT
Start: 2024-10-30

## 2024-10-30 RX ORDER — QUETIAPINE FUMARATE 300 MG/1
600 TABLET, FILM COATED ORAL
Qty: 180 TABLET | Refills: 0 | Status: SHIPPED | OUTPATIENT
Start: 2024-10-30

## 2024-10-30 RX ORDER — QUETIAPINE FUMARATE 100 MG/1
TABLET, FILM COATED ORAL
Qty: 180 TABLET | Refills: 0 | Status: SHIPPED | OUTPATIENT
Start: 2024-10-30

## 2024-10-30 RX ORDER — MIRTAZAPINE 45 MG/1
45 TABLET, FILM COATED ORAL
Qty: 90 TABLET | Refills: 0 | Status: SHIPPED | OUTPATIENT
Start: 2024-10-30

## 2024-10-30 RX ORDER — CLONIDINE HYDROCHLORIDE 0.2 MG/1
0.2 TABLET ORAL
Qty: 90 TABLET | Refills: 0 | Status: SHIPPED | OUTPATIENT
Start: 2024-10-30

## 2024-10-30 NOTE — PATIENT INSTRUCTIONS
Continue seroquel 100 mg bid, 600 mg hs; cymbalta 120 mg , neurontin 800 mg tid, buspar 15 mg tid, lamictal 50 mg qd, remeron 45 mg hs, clonidine 0.2 mg hs  Blood work order mailed to the pt  Continue therapy ( outside [facility])

## 2024-10-30 NOTE — PSYCH
Virtual Regular Visit    Verification of patient location:    Patient is located at Home in the following state in which I hold an active license [place]      Assessment/Plan:  Assessment & Plan  Major depressive disorder, recurrent, moderate (HCC)    Orders:    DULoxetine (CYMBALTA) 60 mg delayed release capsule; Take 2 capsules (120 mg total) by mouth daily    lamoTRIgine (LaMICtal) 25 mg tablet; Take 2 tablets (50 mg total) by mouth daily    QUEtiapine (SEROquel) 300 mg tablet; Take 2 tablets (600 mg total) by mouth daily at bedtime    CBC and differential; Future    Basic metabolic panel; Future    CBC and differential    Basic metabolic panel  lipid panel  Generalized anxiety disorder    Orders:    QUEtiapine (SEROquel) 100 mg tablet; Take 1-2 tabs with quetiapine 600 mg at bed time as needed for anxiety.    cloNIDine (CATAPRES) 0.2 mg tablet; Take 1 tablet (0.2 mg total) by mouth daily at bedtime    busPIRone (BUSPAR) 15 mg tablet; Take 1 tablet (15 mg total) by mouth 3 (three) times a day    gabapentin (NEURONTIN) 800 mg tablet; Take 1 tablet (800 mg total) by mouth 3 (three) times a day    Insomnia, unspecified type    Orders:    mirtazapine (REMERON) 45 MG tablet; Take 1 tablet (45 mg total) by mouth daily at bedtime       Problem List Items Addressed This Visit    None  Visit Diagnoses       Major depressive disorder, recurrent, moderate (HCC)    -  Primary    Generalized anxiety disorder        Insomnia, unspecified type                Goals addressed in session: Goal 1          Reason for visit is   Chief Complaint   Patient presents with    Medication Management        Encounter provider Cori Jesus MD      Recent Visits  No visits were found meeting these conditions.  Showing recent visits within past 7 days and meeting all other requirements  Today's Visits  Date Type Provider Dept   10/30/24 Telemedicine Cori Jesus MD [facility]   Showing today's visits and meeting all other  "requirements  Future Appointments  No visits were found meeting these conditions.  Showing future appointments within next 150 days and meeting all other requirements       The patient was identified by name and date of birth. Cassy Peraza was informed that this is a telemedicine visit and that the visit is being conducted throughthe Epic Embedded platform. She agrees to proceed..  My office door was closed. No one else was in the room.  She acknowledged consent and understanding of privacy and security of the video platform. The patient has agreed to participate and understands they can discontinue the visit at any time.    Patient is aware this is a billable service.     Subjective  Cassy Peraza is a 59 y.o. adult with depression, anxiety, insomnia presents for regular f/u  .  Compliant with meds - takes seroquel 100 mg bid; seroquel 600 mg hs; continued other meds  Not taking atarax - SE sedation; no effect on anxiety  No recent blood work or check up; denies acute medical problems  I reviewed the chart  Pt is more active; outside daily for 1-2 miles walk or just reading for 2-3 hrs if the weather is warm  Pt is in therapy  HPI   Mood - improved energy, more active; reports mild mood swings, does ADLs, stays social  Anxiety - pt continues to have obsessive thoughts about ex  (  7 years ago, he remarried). Pt is jealous, thinks he wants to get back together; gets \" stuck\" in her obsessive thoughts. Pt denies following him; talks to him 1 x week ( discussing children). Pt denies panic attacks. Seroquel 100 mg bid helps with obsessive thoughts.  Sleep - improved; 7-8 hrs. Pt takes seroquel, remeron, clonidine hs with good effect  Past Medical History:   Diagnosis Date    Gastroparesis        History reviewed. No pertinent surgical history.    Current Outpatient Medications   Medication Sig Dispense Refill    busPIRone (BUSPAR) 15 mg tablet Take 1 tablet (15 mg total) by mouth 3 (three) times a day " 270 tablet 0    celecoxib (CeleBREX) 200 mg capsule Take 200 mg by mouth daily      cloNIDine (CATAPRES) 0.2 mg tablet Take 1 tablet (0.2 mg total) by mouth daily at bedtime 90 tablet 0    DULoxetine (CYMBALTA) 60 mg delayed release capsule Take 2 capsules (120 mg total) by mouth daily 180 capsule 0    gabapentin (NEURONTIN) 800 mg tablet Take 1 tablet (800 mg total) by mouth 3 (three) times a day 270 tablet 0    lamoTRIgine (LaMICtal) 25 mg tablet Take 2 tablets (50 mg total) by mouth daily 180 tablet 0    mirtazapine (REMERON) 45 MG tablet Take 1 tablet (45 mg total) by mouth daily at bedtime 90 tablet 0    propranolol (INDERAL) 10 mg tablet Take 1 tablet (10 mg total) by mouth 2 (two) times a day as needed (anxiety) 180 tablet 0    QUEtiapine (SEROquel) 100 mg tablet Take 1-2 tabs with quetiapine 600 mg at bed time as needed for anxiety. 180 tablet 0    QUEtiapine (SEROquel) 300 mg tablet Take 2 tablets (600 mg total) by mouth daily at bedtime 180 tablet 0    albuterol (PROVENTIL HFA,VENTOLIN HFA) 90 mcg/act inhaler TAKE 2 PUFFS EVERY 6 HOURS (COUGH) FOR 30 DAYS      ferrous gluconate (FERGON) 324 mg tablet Take 324 mg by mouth      Fluticasone-Salmeterol (Advair) 250-50 mcg/dose inhaler Inhale 1 puff 2 (two) times a day      hydrOXYzine HCL (ATARAX) 10 mg tablet Take 1 tablet (10 mg total) by mouth daily at bedtime 30 tablet 1    indomethacin (INDOCIN) 50 mg capsule TAKE 1 CAPSULE orally twice a day; Administer with food or milk, ...  (REFER TO PRESCRIPTION NOTES).      pantoprazole (PROTONIX) 40 mg tablet Take 40 mg by mouth 2 (two) times a day      RA Vitamin B-12 TR 1000 MCG TBCR Take 1 tablet by mouth daily      Sodium Fluoride 5000 PPM 1.1 % PSTE 2 (two) times a day       No current facility-administered medications for this visit.        Not on File    Review of Systems   Constitutional:  Negative for activity change and appetite change.   Psychiatric/Behavioral:  Negative for dysphoric mood, sleep  disturbance and suicidal ideas. The patient is not nervous/anxious.        Video Exam    There were no vitals filed for this visit.    Physical Exam  Constitutional:       Appearance: Normal appearance. She is normal weight.   Neurological:      Mental Status: She is alert.   Psychiatric:         Attention and Perception: Attention and perception normal.         Mood and Affect: Mood normal. Affect is blunt.         Speech: Speech normal.         Behavior: Behavior normal. Behavior is cooperative.         Thought Content: Thought content normal.         Judgment: Judgment normal.          Visit Time    Visit Start Time: 9.00 am   Visit Stop Time: 9.21 am   Total Visit Duration:  21 minutes

## 2024-11-26 ENCOUNTER — TELEPHONE (OUTPATIENT)
Dept: PSYCHIATRY | Facility: CLINIC | Age: 59
End: 2024-11-26

## 2024-11-26 NOTE — TELEPHONE ENCOUNTER
Called and left message for client offering follow-up medication check appt. with Dr. Cori Jesus on 1/22/25 at 9:00 am.

## 2024-12-18 DIAGNOSIS — F41.1 GENERALIZED ANXIETY DISORDER: ICD-10-CM

## 2024-12-18 RX ORDER — PROPRANOLOL HYDROCHLORIDE 10 MG/1
10 TABLET ORAL 2 TIMES DAILY PRN
Qty: 180 TABLET | Refills: 0 | Status: SHIPPED | OUTPATIENT
Start: 2024-12-18

## 2024-12-18 NOTE — TELEPHONE ENCOUNTER
Reason for call:   [x] Refill   [] Prior Auth  [] Other:     Office:   [] PCP/Provider -   [x] Specialty/Provider - Psych    Medication:   Propranolol 10mg- take 1 tablet by mouth 2 times a day as needed      Pharmacy: [facility] [place]    Does the patient have enough for 3 days?   [x] Yes   [] No - Send as HP to POD

## 2025-01-13 ENCOUNTER — TELEPHONE (OUTPATIENT)
Dept: PSYCHIATRY | Facility: CLINIC | Age: 60
End: 2025-01-13

## 2025-01-13 NOTE — TELEPHONE ENCOUNTER
Writer called client in response to message left in  outpatient, confirmed 1/22/25 appointment and routed her up to [facility] for [facility] med line.

## 2025-01-22 ENCOUNTER — TELEMEDICINE (OUTPATIENT)
Dept: PSYCHIATRY | Facility: CLINIC | Age: 60
End: 2025-01-22
Payer: COMMERCIAL

## 2025-01-22 DIAGNOSIS — F33.1 MAJOR DEPRESSIVE DISORDER, RECURRENT, MODERATE (HCC): Primary | ICD-10-CM

## 2025-01-22 DIAGNOSIS — G47.00 INSOMNIA, UNSPECIFIED TYPE: ICD-10-CM

## 2025-01-22 DIAGNOSIS — F41.1 GENERALIZED ANXIETY DISORDER: ICD-10-CM

## 2025-01-22 DIAGNOSIS — F33.1 MAJOR DEPRESSIVE DISORDER, RECURRENT, MODERATE (HCC): ICD-10-CM

## 2025-01-22 PROCEDURE — 99214 OFFICE O/P EST MOD 30 MIN: CPT | Performed by: PSYCHIATRY & NEUROLOGY

## 2025-01-22 RX ORDER — BUSPIRONE HYDROCHLORIDE 15 MG/1
15 TABLET ORAL 3 TIMES DAILY
Qty: 270 TABLET | Refills: 1 | Status: SHIPPED | OUTPATIENT
Start: 2025-01-22

## 2025-01-22 RX ORDER — QUETIAPINE FUMARATE 300 MG/1
600 TABLET, FILM COATED ORAL
Qty: 180 TABLET | Refills: 1 | Status: SHIPPED | OUTPATIENT
Start: 2025-01-22 | End: 2025-01-22 | Stop reason: SDUPTHER

## 2025-01-22 RX ORDER — DULOXETIN HYDROCHLORIDE 60 MG/1
60 CAPSULE, DELAYED RELEASE ORAL 2 TIMES DAILY
Qty: 180 CAPSULE | Refills: 1 | Status: SHIPPED | OUTPATIENT
Start: 2025-01-22

## 2025-01-22 RX ORDER — QUETIAPINE FUMARATE 300 MG/1
600 TABLET, FILM COATED ORAL
Qty: 180 TABLET | Refills: 0 | Status: SHIPPED | OUTPATIENT
Start: 2025-01-22

## 2025-01-22 RX ORDER — CLONIDINE HYDROCHLORIDE 0.2 MG/1
0.2 TABLET ORAL
Qty: 90 TABLET | Refills: 1 | Status: SHIPPED | OUTPATIENT
Start: 2025-01-22

## 2025-01-22 RX ORDER — QUETIAPINE FUMARATE 100 MG/1
TABLET, FILM COATED ORAL
Qty: 180 TABLET | Refills: 0 | Status: SHIPPED | OUTPATIENT
Start: 2025-01-22

## 2025-01-22 RX ORDER — QUETIAPINE FUMARATE 100 MG/1
TABLET, FILM COATED ORAL
Qty: 180 TABLET | Refills: 1 | Status: SHIPPED | OUTPATIENT
Start: 2025-01-22 | End: 2025-01-22 | Stop reason: SDUPTHER

## 2025-01-22 RX ORDER — GABAPENTIN 800 MG/1
800 TABLET ORAL 3 TIMES DAILY
Qty: 270 TABLET | Refills: 1 | Status: SHIPPED | OUTPATIENT
Start: 2025-01-22

## 2025-01-22 RX ORDER — MIRTAZAPINE 45 MG/1
45 TABLET, FILM COATED ORAL
Qty: 90 TABLET | Refills: 1 | Status: SHIPPED | OUTPATIENT
Start: 2025-01-22

## 2025-01-22 RX ORDER — LAMOTRIGINE 25 MG/1
50 TABLET ORAL DAILY
Qty: 180 TABLET | Refills: 1 | Status: SHIPPED | OUTPATIENT
Start: 2025-01-22

## 2025-01-22 NOTE — PSYCH
Virtual Regular Visit    Verification of patient location:    Patient is located at Home in the following state in which I hold an active license [place]      Assessment/Plan:  Assessment & Plan  Major depressive disorder, recurrent, moderate (HCC)    Orders:    DULoxetine (CYMBALTA) 60 mg delayed release capsule; Take 1 capsule (60 mg total) by mouth 2 (two) times a day    lamoTRIgine (LaMICtal) 25 mg tablet; Take 2 tablets (50 mg total) by mouth daily    QUEtiapine (SEROquel) 300 mg tablet; Take 2 tablets (600 mg total) by mouth daily at bedtime    Generalized anxiety disorder    Orders:    QUEtiapine (SEROquel) 100 mg tablet; Take 1-2 tabs with quetiapine 600 mg at bed time .    cloNIDine (CATAPRES) 0.2 mg tablet; Take 1 tablet (0.2 mg total) by mouth daily at bedtime    busPIRone (BUSPAR) 15 mg tablet; Take 1 tablet (15 mg total) by mouth 3 (three) times a day    gabapentin (NEURONTIN) 800 mg tablet; Take 1 tablet (800 mg total) by mouth 3 (three) times a day    Insomnia, unspecified type    Orders:    mirtazapine (REMERON) 45 MG tablet; Take 1 tablet (45 mg total) by mouth daily at bedtime       Problem List Items Addressed This Visit    None  Visit Diagnoses         Major depressive disorder, recurrent, moderate (HCC)    -  Primary      Generalized anxiety disorder          Insomnia, unspecified type                Goals addressed in session: Goal 1     Depression Follow-up Plan Completed: Not applicable    Reason for visit is   Chief Complaint   Patient presents with    Medication Management        Encounter provider Cori Jesus MD      Recent Visits  No visits were found meeting these conditions.  Showing recent visits within past 7 days and meeting all other requirements  Today's Visits  Date Type Provider Dept   01/22/25 Telemedicine Cori Jesus MD [facility]   Showing today's visits and meeting all other requirements  Future Appointments  No visits were found meeting these conditions.  Showing  "future appointments within next 150 days and meeting all other requirements       The patient was identified by name and date of birth. Cassy Peraza was informed that this is a telemedicine visit and that the visit is being conducted throughthe Epic Embedded platform. She agrees to proceed..  My office door was closed. No one else was in the room.  She acknowledged consent and understanding of privacy and security of the video platform. The patient has agreed to participate and understands they can discontinue the visit at any time.    Patient is aware this is a billable service.     Subjective  Cassy Peraza is a 59 y.o. female with depression, anxiety, insomnia presents for regular f/u  .  Compliant with meds, denies SE  I reviewed the chart and blood work. Pt was admitted in 11/24 in [place] for acute gastroenteritis - improved  She lost her job as [employer]  and is looking for another job.  She has 4 grandchildren - 16,14,5,3 and watches the youngest 3 x week   HPI   Mood - pt continues to c/o \" bad days\" 3 x week - gets sad and low motivation, but forces herself to function and do ADLs,   She stays social and active. In the hospital cymbalta was changed to bid ; good effect.  Anxiety - controlled; denies panic attacks or racing thoughts  Sleep - good, 7 hrs  Past Medical History:   Diagnosis Date    Gastroparesis        History reviewed. No pertinent surgical history.    Current Outpatient Medications   Medication Sig Dispense Refill    albuterol (PROVENTIL HFA,VENTOLIN HFA) 90 mcg/act inhaler TAKE 2 PUFFS EVERY 6 HOURS (COUGH) FOR 30 DAYS      busPIRone (BUSPAR) 15 mg tablet Take 1 tablet (15 mg total) by mouth 3 (three) times a day 270 tablet 0    celecoxib (CeleBREX) 200 mg capsule Take 200 mg by mouth daily      cloNIDine (CATAPRES) 0.2 mg tablet Take 1 tablet (0.2 mg total) by mouth daily at bedtime 90 tablet 0    DULoxetine (CYMBALTA) 60 mg delayed release capsule Take 2 capsules (120 mg total) by mouth " daily 180 capsule 0    ferrous gluconate (FERGON) 324 mg tablet Take 324 mg by mouth      Fluticasone-Salmeterol (Advair) 250-50 mcg/dose inhaler Inhale 1 puff 2 (two) times a day      gabapentin (NEURONTIN) 800 mg tablet Take 1 tablet (800 mg total) by mouth 3 (three) times a day 270 tablet 0    indomethacin (INDOCIN) 50 mg capsule TAKE 1 CAPSULE orally twice a day; Administer with food or milk, ...  (REFER TO PRESCRIPTION NOTES).      lamoTRIgine (LaMICtal) 25 mg tablet Take 2 tablets (50 mg total) by mouth daily 180 tablet 0    mirtazapine (REMERON) 45 MG tablet Take 1 tablet (45 mg total) by mouth daily at bedtime 90 tablet 0    pantoprazole (PROTONIX) 40 mg tablet Take 40 mg by mouth 2 (two) times a day      propranolol (INDERAL) 10 mg tablet Take 1 tablet (10 mg total) by mouth 2 (two) times a day as needed (anxiety) 180 tablet 0    QUEtiapine (SEROquel) 100 mg tablet Take 1-2 tabs with quetiapine 600 mg at bed time as needed for anxiety. 180 tablet 0    QUEtiapine (SEROquel) 300 mg tablet Take 2 tablets (600 mg total) by mouth daily at bedtime 180 tablet 0    RA Vitamin B-12 TR 1000 MCG TBCR Take 1 tablet by mouth daily      Sodium Fluoride 5000 PPM 1.1 % PSTE 2 (two) times a day       No current facility-administered medications for this visit.        Not on File    Review of Systems   Constitutional:  Negative for activity change and appetite change.   Psychiatric/Behavioral:  Negative for dysphoric mood, sleep disturbance and suicidal ideas. The patient is not nervous/anxious.        Video Exam    There were no vitals filed for this visit.    Physical Exam  Constitutional:       Appearance: Normal appearance. She is normal weight.   Neurological:      Mental Status: She is alert.   Psychiatric:         Attention and Perception: Attention and perception normal.         Mood and Affect: Mood and affect normal.         Speech: Speech normal.         Behavior: Behavior normal. Behavior is cooperative.          Thought Content: Thought content normal.         Judgment: Judgment normal.          Visit Time  Face to face  Visit Start Time: 9.00 am   Visit Stop Time: 9.09 am   Total Visit Duration:  23 minutes total spent in patient care

## 2025-01-22 NOTE — BH TREATMENT PLAN
TREATMENT PLAN (Medication Management Only)        [facility]    Name and Date of Birth:  Cassy Peraza 59 y.o. 1965  Date of Treatment Plan: January 22, 2025  Diagnosis/Diagnoses:    1. Major depressive disorder, recurrent, moderate (HCC)    2. Generalized anxiety disorder    3. Insomnia, unspecified type      Strengths/Personal Resources for Self-Care: taking medications as prescribed, motivation for treatment.  Area/Areas of need (in own words): anxiety, depression, sleep problems  1. Long Term Goal: maintain mood stability.  Target Date:6 months - 7/22/2025  Person/Persons responsible for completion of goal: Cassy  2.  Short Term Objective (s) - How will we reach this goal?:   A. Provider new recommended medication/dosage changes and/or continue medication(s): continue current medications as prescribed Cymbalta, Seroquel, Buspar.  B. N/A.  C. N/A.  Target Date:6 months - 7/22/2025  Person/Persons Responsible for Completion of Goal: Cassy  Progress Towards Goals: stable  Treatment Modality: medication management every 6 months  Review due 180 days from date of this plan: 6 months - 7/22/2025  Expected length of service: ongoing treatment  My Physician/PA/NP and I have developed this plan together and I agree to work on the goals and objectives. I understand the treatment goals that were developed for my treatment.

## 2025-01-22 NOTE — TELEPHONE ENCOUNTER
Change in Pharmacy    Reason for call:   [x] Refill   [] Prior Auth  [] Other:     Office:   [] PCP/Provider -   [x] Specialty/Provider - Psych    Medication: Quetiapine    Dose/Frequency: 300 mg    Quantity: 180 tablets    Pharmacy: [facility]/pharmacy #[id number] [place] [address]      Does the patient have enough for 3 days?   [] Yes   [x] No - Send as HP to POD      Reason for call:   [x] Refill   [] Prior Auth  [] Other:     Office:   [] PCP/Provider -   [x] Specialty/Provider - Psych    Medication: Quetiapine    Dose/Frequency: 100 mg    Quantity: 180 tablets    Pharmacy: [facility]/pharmacy #[id number] [place] [address]      Does the patient have enough for 3 days?   [] Yes   [x] No - Send as HP to POD       MEDICAL ONCOLOGY/HEMATOLOGY PROGRESS NOTE    Patient Active Problem List    Diagnosis Date Noted   • Hypothyroidism      Priority: Low   • Osteoporosis      Priority: Low   • RA (rheumatoid arthritis) (CMS/HCC)      Priority: Low   • Myeloproliferative neoplasm (CMS/HCC) 09/03/2014     Priority: Low     Overview Note:     September 2014. Evaluation of thrombocytosis. DONNIE 2 positive  Rx ASA  2017 Rx Hydroxyurea         HISTORY OF PRESENT ILLNESS:  Rina is seen today with her daughter for regularly scheduled 6 month follow-up. She is known to our service for JAK2+ MPN (ET).     In short summary, patient was seen in consultation 9/2014 for thrombocytosis. She was monitored with ASA alone and ultimately initiated therapy with hydrea when platelet count was >1 million in 2/2017. She was initially treated with 200 mg once/week, then twice/week. In 4/2017 she was increased to 200 mg QOD and then ultimately 400 mg daily as of 5/2017. She remains on 400 mg hydrea daily, concurrent with ASA 81 mg BID.     *no prior BMBX on file, diagnosis of ET was based on peripheral blood for JAK2 mutation        Interval history:  Rina is feeling fair today. She admits to progressive fatigue and weight loss, moreso in the last 2-3 months. She wonders why she feels so poorly. Her appetite is low. She is compliant with hydrea 400 mg daily and ASA 81 mg BID. There have been no recent falls. Patient is very hard of hearing. Her daughter is in agreement that she is feeling poorly these days.     PAST MEDICAL HISTORY AND PAST SURGICAL HISTORY:  Reviewed and interval changes as noted above.    CURRENT MEDICATIONS:  Reviewed in electronic medical records.    FAMILY HISTORY AND SOCIAL HISTORY:  Reviewed and interval changes as noted above.    REVIEW OF SYSTEMS:  Per electronic medical record, reviewed and agreed upon.    PHYSICAL EXAMINATION:  Vital Signs: Blood pressure (!) 145/68, pulse 65, temperature 98.1 °F (36.7 °C), resp. rate 12, weight  37.9 kg (83 lb 9.6 oz), SpO2 96 %.   ECOG Performance Status   ECOG [05/04/22 1301]   ECOG Performance Status 2      General Appearance: 84 year old female in no acute distress. Very hard of hearing.  Psychiatric:  Mood and affect are normal. Judgment and insight are appropriate.  Head:  Normocephalic, atraumatic.  Ears, Nose, Throat/Eyes: PERRL (Pupils equal, round, reactive to light), no conjunctival or scleral changes. No ear drainage, no obvious auditory deficits. No oral lesions.  Neck: Supple, no thyroid or neck masses. Trachea midline.   Lymphatic: No pathological adenopathy palpated.  Cardiovascular: Heart had a regular rhythm and rate with no gallops, rubs, or murmurs noted.  Respiratory: Normal respiratory effort, bilateral and symmetric expansion. Lungs are clear to auscultation bilaterally.   Abdomen: No masses or ascites, no hepatosplenomegaly, soft and nontender. Normal bowel sounds, no rebound tenderness.   Musculoskeletal: pain to palpation mid right humerus without crepitus or concern for fx  Skin: generalized pallor, stable  Neurologic: No focal deficits, presents in wheelchair.    LABORATORY DATA:  Lab Services on 05/04/2022   Component Date Value   • Sodium 05/04/2022 127 (A)   • Potassium 05/04/2022 4.5    • Chloride 05/04/2022 92 (A)   • Carbon Dioxide 05/04/2022 30    • Anion Gap 05/04/2022 10    • Glucose 05/04/2022 98    • BUN 05/04/2022 15    • Creatinine 05/04/2022 0.48 (A)   • Glomerular Filtration Ra* 05/04/2022 >90    • BUN/ Creatinine Ratio 05/04/2022 31 (A)   • Calcium 05/04/2022 8.5    • Bilirubin, Total 05/04/2022 0.7    • GOT/AST 05/04/2022 17    • GPT/ALT 05/04/2022 21    • Alkaline Phosphatase 05/04/2022 60    • Albumin 05/04/2022 3.7    • Protein, Total 05/04/2022 6.0 (A)   • Globulin 05/04/2022 2.3    • A/G Ratio 05/04/2022 1.6    • LD, Total 05/04/2022 511 (A)   • WBC 05/04/2022 11.1 (A)   • RBC 05/04/2022 3.38 (A)   • HGB 05/04/2022 12.7    • HCT 05/04/2022 38.7    • MCV  05/04/2022 114.5 (A)   • MCH 05/04/2022 37.6 (A)   • MCHC 05/04/2022 32.8    • RDW-CV 05/04/2022 15.2 (A)   • RDW-SD 05/04/2022 63.9 (A)   • PLT 05/04/2022 477 (A)   • NRBC 05/04/2022 0    • Neutrophil, Percent 05/04/2022 87    • Lymphocytes, Percent 05/04/2022 6    • Mono, Percent 05/04/2022 4    • Eosinophils, Percent 05/04/2022 1    • Basophils, Percent 05/04/2022 1    • Immature Granulocytes 05/04/2022 1    • Absolute Neutrophils 05/04/2022 9.8 (A)   • Absolute Lymphocytes 05/04/2022 0.6 (A)   • Absolute Monocytes 05/04/2022 0.4    • Absolute Eosinophils  05/04/2022 0.1    • Absolute Basophils 05/04/2022 0.1    • Absolute Immmature Granu* 05/04/2022 0.1    • Extra Tube 05/04/2022 Hold for Add Ons    • Vitamin B12 05/04/2022 426    • Folate 05/04/2022 11.3    • TSH 05/04/2022 2.566    • Ferritin 05/04/2022 230    • Iron 05/04/2022 84    • Iron Binding Capacity 05/04/2022 260    • Iron, Percent Saturation 05/04/2022 32    • Magnesium 05/04/2022 1.6 (A)   • Pathologist comments 05/04/2022 See Comment.    • Results Needing Review b* 05/04/2022 Yes (A)     CBC trend x 2 years:   Plts 300-400  hgb 17-18  hct 50-55      CLINICAL IMPRESSION AND PLAN:  #JAK2+ MPN (ET)- patient seen today for regularly scheduled follow-up. She is tolerating ASA and hydrea without any significant toxicity.   -- Patient has been declining in the last 2-3 months, noting fatigue/weakness and weight loss (approx 8 pounds in 2 months). Concerning today is progressive macrocytosis and elevation in LDH, without any overt metabolic, nutritional or infectious complication.   -- I had a long discussion with patient and daughter, while certainly megaloblastic anemia could be a sequella of hydrea use, I am concerned this could potentially represent early transformation of disease from MPN to MDS or acute leukemia. Ddx for functional decline, elevated LDH also includes RA exacerbation, hypothyroidism.  Fortunately, peripheral smear without any overt  blasts or dysplasia.   -- We discussed proceeding with additional lab testing today and short-term follow-up in 1 month. At present, we will not adjust hydrea or ASA dosing.  ---- In the event of progressive hematologic disease, we could pursue bone marrow for definitive evaluation if within goals of care for patient/family, though I do not think patient would be a candidate for aggressive therapy based on her performance status.   -- Did discuss signs/symptoms that warrant sooner follow-up in office, including significant bleeding/bruising, skin ulceration, constitutional symptoms.     #2 Macrocytosis  -- MCV elevation is secondary to hydrea use.   -- B12 level OK when last checked 2017, update today      Over 35 minutes spent in combined care including record review.   Did discuss case with covering MD AS.     ADDENDUM:  Workup unremarkable.   LVMOM for daughter on 5/4 and again on 5/9.   If possible I would prefer she see Dr. JIMENEZ when she comes back in early June.   Did message PCP team as well with update, and also encouraged daughter to schedule f/u with PCP given hyponatremia.   Await return call       Massiel Ji PA-C

## 2025-03-12 DIAGNOSIS — F41.1 GENERALIZED ANXIETY DISORDER: ICD-10-CM

## 2025-03-12 RX ORDER — PROPRANOLOL HYDROCHLORIDE 10 MG/1
10 TABLET ORAL 2 TIMES DAILY PRN
Qty: 180 TABLET | Refills: 0 | Status: SHIPPED | OUTPATIENT
Start: 2025-03-12

## 2025-03-21 LAB
BASOPHILS # BLD AUTO: 0.1 X10E3/UL (ref 0–0.2)
BASOPHILS NFR BLD AUTO: 2 %
BUN SERPL-MCNC: 16 MG/DL (ref 8–27)
BUN/CREAT SERPL: 18 (ref 12–28)
CALCIUM SERPL-MCNC: 9.8 MG/DL (ref 8.7–10.3)
CHLORIDE SERPL-SCNC: 101 MMOL/L (ref 96–106)
CHOLEST SERPL-MCNC: 191 MG/DL (ref 100–199)
CHOLEST/HDLC SERPL: 3 RATIO (ref 0–4.4)
CO2 SERPL-SCNC: 23 MMOL/L (ref 20–29)
CREAT SERPL-MCNC: 0.91 MG/DL (ref 0.57–1)
EGFR: 72 ML/MIN/1.73
EOSINOPHIL # BLD AUTO: 0.1 X10E3/UL (ref 0–0.4)
EOSINOPHIL NFR BLD AUTO: 2 %
ERYTHROCYTE [DISTWIDTH] IN BLOOD BY AUTOMATED COUNT: 18.7 % (ref 11.7–15.4)
GLUCOSE SERPL-MCNC: 107 MG/DL (ref 70–99)
HCT VFR BLD AUTO: 39.1 % (ref 34–46.6)
HDLC SERPL-MCNC: 63 MG/DL
HGB BLD-MCNC: 12.2 G/DL (ref 11.1–15.9)
IMM GRANULOCYTES # BLD: 0 X10E3/UL (ref 0–0.1)
IMM GRANULOCYTES NFR BLD: 0 %
LDLC SERPL CALC-MCNC: 109 MG/DL (ref 0–99)
LYMPHOCYTES # BLD AUTO: 1.4 X10E3/UL (ref 0.7–3.1)
LYMPHOCYTES NFR BLD AUTO: 31 %
MCH RBC QN AUTO: 24.2 PG (ref 26.6–33)
MCHC RBC AUTO-ENTMCNC: 31.2 G/DL (ref 31.5–35.7)
MCV RBC AUTO: 77 FL (ref 79–97)
MONOCYTES # BLD AUTO: 0.4 X10E3/UL (ref 0.1–0.9)
MONOCYTES NFR BLD AUTO: 9 %
NEUTROPHILS # BLD AUTO: 2.5 X10E3/UL (ref 1.4–7)
NEUTROPHILS NFR BLD AUTO: 56 %
PLATELET # BLD AUTO: 474 X10E3/UL (ref 150–450)
POTASSIUM SERPL-SCNC: 5.1 MMOL/L (ref 3.5–5.2)
RBC # BLD AUTO: 5.05 X10E6/UL (ref 3.77–5.28)
SL AMB VLDL CHOLESTEROL CALC: 19 MG/DL (ref 5–40)
SODIUM SERPL-SCNC: 142 MMOL/L (ref 134–144)
TRIGL SERPL-MCNC: 105 MG/DL (ref 0–149)
WBC # BLD AUTO: 4.6 X10E3/UL (ref 3.4–10.8)

## 2025-04-14 DIAGNOSIS — F41.1 GENERALIZED ANXIETY DISORDER: ICD-10-CM

## 2025-04-14 DIAGNOSIS — F33.1 MAJOR DEPRESSIVE DISORDER, RECURRENT, MODERATE (HCC): ICD-10-CM

## 2025-04-14 RX ORDER — QUETIAPINE FUMARATE 300 MG/1
600 TABLET, FILM COATED ORAL
Qty: 180 TABLET | Refills: 0 | Status: SHIPPED | OUTPATIENT
Start: 2025-04-14

## 2025-04-14 RX ORDER — QUETIAPINE FUMARATE 100 MG/1
100-200 TABLET, FILM COATED ORAL
Qty: 180 TABLET | Refills: 0 | Status: SHIPPED | OUTPATIENT
Start: 2025-04-14

## 2025-06-03 DIAGNOSIS — F41.1 GENERALIZED ANXIETY DISORDER: ICD-10-CM

## 2025-06-03 RX ORDER — PROPRANOLOL HYDROCHLORIDE 10 MG/1
10 TABLET ORAL 2 TIMES DAILY PRN
Qty: 180 TABLET | Refills: 0 | Status: SHIPPED | OUTPATIENT
Start: 2025-06-03

## 2025-06-25 DIAGNOSIS — F41.1 GENERALIZED ANXIETY DISORDER: ICD-10-CM

## 2025-06-25 DIAGNOSIS — F33.1 MAJOR DEPRESSIVE DISORDER, RECURRENT, MODERATE (HCC): ICD-10-CM

## 2025-06-25 DIAGNOSIS — G47.00 INSOMNIA, UNSPECIFIED TYPE: ICD-10-CM

## 2025-06-26 NOTE — TELEPHONE ENCOUNTER
Patient called in to check the status of refill for seroquel, remeron, lamotrigine, gabapentin, and buspirone . Patient advised a refill was requested on 06/26/25 and is pending approval. Patient verbalized understanding and is in agreement.     Does the patient have enough for 3 days?   [x] Yes   [] No - Send as HP to POD

## 2025-06-27 RX ORDER — GABAPENTIN 800 MG/1
800 TABLET ORAL 3 TIMES DAILY
Qty: 270 TABLET | Refills: 0 | Status: SHIPPED | OUTPATIENT
Start: 2025-06-27

## 2025-06-27 RX ORDER — LAMOTRIGINE 25 MG/1
50 TABLET ORAL DAILY
Qty: 180 TABLET | Refills: 0 | Status: SHIPPED | OUTPATIENT
Start: 2025-06-27

## 2025-06-27 RX ORDER — BUSPIRONE HYDROCHLORIDE 15 MG/1
15 TABLET ORAL 3 TIMES DAILY
Qty: 270 TABLET | Refills: 0 | Status: SHIPPED | OUTPATIENT
Start: 2025-06-27 | End: 2025-07-10 | Stop reason: SDUPTHER

## 2025-06-27 RX ORDER — DULOXETIN HYDROCHLORIDE 60 MG/1
60 CAPSULE, DELAYED RELEASE ORAL 2 TIMES DAILY
Qty: 180 CAPSULE | Refills: 0 | Status: SHIPPED | OUTPATIENT
Start: 2025-06-27 | End: 2025-07-10 | Stop reason: SDUPTHER

## 2025-06-27 RX ORDER — MIRTAZAPINE 45 MG/1
45 TABLET, FILM COATED ORAL
Qty: 90 TABLET | Refills: 0 | Status: SHIPPED | OUTPATIENT
Start: 2025-06-27

## 2025-06-27 RX ORDER — CLONIDINE HYDROCHLORIDE 0.2 MG/1
0.2 TABLET ORAL
Qty: 90 TABLET | Refills: 0 | Status: SHIPPED | OUTPATIENT
Start: 2025-06-27 | End: 2025-07-10 | Stop reason: SDUPTHER

## 2025-06-27 NOTE — TELEPHONE ENCOUNTER
Pt called refill line checking on status of refills as pharmacy told her only one script had been sent to them and they were waiting on everything else. I informed her everything was sent and confirmed by pharmacy except Seroquel and we were waiting on approval from Dr. Jesus. Pt verbalized understanding.

## 2025-06-30 RX ORDER — QUETIAPINE FUMARATE 100 MG/1
100-200 TABLET, FILM COATED ORAL
Qty: 180 TABLET | Refills: 0 | Status: SHIPPED | OUTPATIENT
Start: 2025-06-30

## 2025-06-30 RX ORDER — QUETIAPINE FUMARATE 300 MG/1
600 TABLET, FILM COATED ORAL
Qty: 180 TABLET | Refills: 0 | Status: SHIPPED | OUTPATIENT
Start: 2025-06-30

## 2025-07-01 ENCOUNTER — TELEPHONE (OUTPATIENT)
Dept: PSYCHIATRY | Facility: CLINIC | Age: 60
End: 2025-07-01

## 2025-07-01 ENCOUNTER — TELEPHONE (OUTPATIENT)
Age: 60
End: 2025-07-01

## 2025-07-01 NOTE — TELEPHONE ENCOUNTER
Zayda Simon, psychiatrist, [facility], called in regard to patient is hospital at this time. Zayda wanted to let provider know of some updates as to what she may think is going on with patient.     Phone number [phone]

## 2025-07-09 ENCOUNTER — TELEPHONE (OUTPATIENT)
Age: 60
End: 2025-07-09

## 2025-07-09 ENCOUNTER — TELEPHONE (OUTPATIENT)
Dept: PSYCHIATRY | Facility: CLINIC | Age: 60
End: 2025-07-09

## 2025-07-09 NOTE — TELEPHONE ENCOUNTER
Called Cassy ([phone]) she has an appointment coming up on 7/23 and she is currently in a psychiatric hospital. She said on Friday she went to the orthopedic surgeon who gave her an injection for her knee and a script for hydrocodone. Sunday she worked 12 hours and was walking the whole time, so when she came home she took two hydrocodone and her regularly prescribed medication. She woke up at 3 am really thirsty so went to get water. While in the kitchen she felt dizzy and fell. Her dad came and couldn't get her up, so they called the ambulance. She was taken to [facility] and they believe she attempted to commit suicide. She said she absolutely didn't do this, she didn't know she couldn't take her medication with hydrocodone. She said she is very [place] and this is against her jack. The hospital doesn't believe her and told her she can either voluntarily commit herself or they will involuntary commit her. She decided to sign a 201. Today it has been 72 hours and the hospital still doesn't believe her. She said they plan on calling Dr. Jesus to see if Dr. Jesus feels it is ok for her to be discharged. She told the hospital she has been under Dr. Jesus's care for 10 years and doesn't want them to change her medication, she wants to discuss that with her psychiatrist at her upcoming appointment. Forwarding to provider for review.

## 2025-07-09 NOTE — TELEPHONE ENCOUNTER
Called Cassy and informed her Dr. Jesus doesn't call patients while they are admitted in the hospital. She verbalized understanding.

## 2025-07-09 NOTE — TELEPHONE ENCOUNTER
Patient is currently in a [facility] hospital and is requesting a call. Please reach out if possible.

## 2025-07-09 NOTE — TELEPHONE ENCOUNTER
Dr Beltran calling to advise that patient is currently in the hospital due to suspected suicide attempt. Patient is insistent that this is not what happened. The hospital does not believe the patient and believes it was intentional.     Provider would like to discuss patients status with Dr Ann Marie GRESHAM to determine if discharge would be appropriate.     Please contact Dr Beltran @ [phone]

## 2025-07-09 NOTE — TELEPHONE ENCOUNTER
Writer called client and confirmed she would be discharged from hospital by tomorrow, client confirmed. Writer scheduled client's HDC in the 4:00 pm slot that [facility] was unable to as it was a New Patient slot.     Writer called back realizing yearly paperwork will be due, client stated she will either come in and do the forms or will do over the MYChart. Client put  Saúl on phone who confirmed tomorrow's discharge appointment and took fax number to send d/c paperwork.

## 2025-07-09 NOTE — TELEPHONE ENCOUNTER
Patient called in inquiring about call from provider.     Writer informed patient that provider just gave them a call.     Patient shared that she wasn't aware and is wondering if provider can still call them again.     Writer informed patient msg will be relay.

## 2025-07-09 NOTE — TELEPHONE ENCOUNTER
Patient called in to schedule appt for TOMORROW at 400 with Dr. Jesus.    It would not let me put the appt through for this patient for some reason.

## 2025-07-10 ENCOUNTER — TELEPHONE (OUTPATIENT)
Dept: PSYCHIATRY | Facility: CLINIC | Age: 60
End: 2025-07-10

## 2025-07-10 ENCOUNTER — TELEMEDICINE (OUTPATIENT)
Dept: PSYCHIATRY | Facility: CLINIC | Age: 60
End: 2025-07-10
Payer: COMMERCIAL

## 2025-07-10 DIAGNOSIS — F41.1 GENERALIZED ANXIETY DISORDER: ICD-10-CM

## 2025-07-10 DIAGNOSIS — F33.1 MAJOR DEPRESSIVE DISORDER, RECURRENT, MODERATE (HCC): ICD-10-CM

## 2025-07-10 PROCEDURE — 99214 OFFICE O/P EST MOD 30 MIN: CPT | Performed by: PSYCHIATRY & NEUROLOGY

## 2025-07-10 RX ORDER — CLONIDINE HYDROCHLORIDE 0.2 MG/1
0.2 TABLET ORAL
Qty: 90 TABLET | Refills: 0 | Status: SHIPPED | OUTPATIENT
Start: 2025-07-10

## 2025-07-10 RX ORDER — BUSPIRONE HYDROCHLORIDE 15 MG/1
15 TABLET ORAL 3 TIMES DAILY
Qty: 270 TABLET | Refills: 0 | Status: SHIPPED | OUTPATIENT
Start: 2025-07-10

## 2025-07-10 RX ORDER — DULOXETIN HYDROCHLORIDE 60 MG/1
60 CAPSULE, DELAYED RELEASE ORAL 2 TIMES DAILY
Qty: 180 CAPSULE | Refills: 0 | Status: SHIPPED | OUTPATIENT
Start: 2025-07-10

## 2025-07-10 RX ORDER — ASPIRIN 81 MG/1
81 TABLET ORAL DAILY
COMMUNITY
Start: 2025-07-05

## 2025-07-10 RX ORDER — METOPROLOL SUCCINATE 25 MG/1
TABLET, EXTENDED RELEASE ORAL DAILY
COMMUNITY
Start: 2025-07-05

## 2025-07-10 NOTE — TELEPHONE ENCOUNTER
Writer called and scheduled follow up with Dr. Jesus. Client would prefer a morning at 9. Client scheduled for 8/13/25.

## 2025-07-10 NOTE — PSYCH
MEDICATION MANAGEMENT NOTE    Name: Cassy Peraza      : 1965      MRN: [id number]  Encounter Provider: Cori Jesus MD  Encounter Date: 7/10/2025   Encounter department: [facility] OUTPATIENT    Insurance: Payor: [employer]  REP / Plan: [address] REP / Product Type: Medicare HMO /      Reason for Visit:   Chief Complaint   Patient presents with    Medication Management   :  Assessment & Plan  Major depressive disorder, recurrent, moderate (HCC)    Orders:    DULoxetine (CYMBALTA) 60 mg delayed release capsule; Take 1 capsule (60 mg total) by mouth 2 (two) times a day  seroquel 100 mg qd, 500 mg hs, remeron 45 mg hs, lamictal 50 mg hs  Generalized anxiety disorder    Orders:    cloNIDine (CATAPRES) 0.2 mg tablet; Take 1 tablet (0.2 mg total) by mouth daily at bedtime    busPIRone (BUSPAR) 15 mg tablet; Take 1 tablet (15 mg total) by mouth 3 (three) times a day  neurontin 800 mg tid      Treatment Recommendations:    Educated about diagnosis and treatment modalities. Verbalizes understanding and agreement with the treatment plan.  Discussed self monitoring of symptoms, and symptom monitoring tools.  Discussed medications and if treatment adjustment was needed or desired.  Aware of 24 hour and weekend coverage for urgent situations accessed by calling [facility] main practice number  I am scheduling this patient out for greater than 3 months: No    Medications Risks/Benefits:      Risks, Benefits And Possible Side Effects Of Medications:    Risks, benefits, and possible side effects of medications explained to Cassy and she (or legal representative) verbalizes understanding and agreement for treatment.    Controlled Medication Discussion:     Cassy is using medication appropriately.      History of Present Illness       Pt presents for f/u appt after recent admission .  While hospitalized, she was d/w attendings on several occasions.  I received  "and reviewed d/c summary.   Pt reports regular f/u with ortho for knee pain, planning surgery in 09/25.  She got steroid injection to the R knee in the end on June and was regularly rx vicodin , as per PDMP.   Pt reports taking 1 tab daily for knee pain usually. But, on the day of admission, she was working in the [email or web address] ( [place]) and c/o \" severe knee pain\". When she came home, she took 2 tab of vicodin for pain and also took all psych evening meds, around 11 pm.  She fell asleep, but woke up at 3 am to get some water - felt dizzy, unbalanced and fell on the floor. Father heard the noise, found her unresponsive and called 911.  Pt was admitted to [facility] , medically cleared and transferred to psych unit.   Pt strongly denied SI or SA. She stopped vicodin ( her daughter has it now). Seroquel was decreased to 500 mg hs; propranolol stopped; other meds continued at current doses.  Pt reports feeling better now. She is busy taking care of 6 y.o granddaughter, driving her to the pool, museums, zoo. On weekends pt works as [employer], 12 hrs x day. Pt lives with her father; daughter visits regularly.  Pt reports mostly  stable mood, denies depression or mood swings. She is back to baseline, social, active, does ADLs.   Anxiety - controlled; denies panic attacks or racing thoughts  Sleep, appetite - good    Review Of Systems: A review of systems is obtained and is negative except for the pertinent positives listed in HPI/Subjective above.      Current Rating Scores:         Areas of Improvement: reviewed in HPI/Subjective Section      Past Medical History[1]  Past Surgical History[2]  Allergies: Allergies[3]    Current Outpatient Medications   Medication Instructions    albuterol (PROVENTIL HFA,VENTOLIN HFA) 90 mcg/act inhaler TAKE 2 PUFFS EVERY 6 HOURS (COUGH) FOR 30 DAYS    aspirin (ECOTRIN LOW STRENGTH) 81 mg, Daily    busPIRone (BUSPAR) 15 mg, Oral, 3 times daily    celecoxib (CELEBREX) 200 mg, Oral, Daily    " cloNIDine (CATAPRES) 0.2 mg, Oral, Daily at bedtime    DULoxetine (CYMBALTA) 60 mg, Oral, 2 times daily    ferrous gluconate (FERGON) 324 mg, Oral    Fluticasone-Salmeterol (Advair) 250-50 mcg/dose inhaler 1 puff, Inhalation, 2 times daily    gabapentin (NEURONTIN) 800 mg, Oral, 3 times daily    indomethacin (INDOCIN) 50 mg capsule TAKE 1 CAPSULE orally twice a day; Administer with food or milk, ...  (REFER TO PRESCRIPTION NOTES).    lamoTRIgine (LAMICTAL) 50 mg, Oral, Daily    metoprolol succinate (TOPROL-XL) 25 mg 24 hr tablet Daily    mirtazapine (REMERON) 45 mg, Oral, Daily at bedtime    pantoprazole (PROTONIX) 40 mg, Oral, 2 times daily    QUEtiapine (SEROQUEL) 100-200 mg, Oral, Daily at bedtime    QUEtiapine (SEROQUEL) 600 mg, Oral, Daily at bedtime    RA Vitamin B-12 TR 1000 MCG TBCR 1 tablet, Oral, Daily    Sodium Fluoride 5000 PPM 1.1 % PSTE 2 times daily        Substance Abuse History:    Tobacco, Alcohol and Drug Use History     Tobacco Use    Smoking status: Not on file    Smokeless tobacco: Not on file   Substance Use Topics    Alcohol use: Not on file    Drug use: Not on file          Social History:    Social History     Socioeconomic History    Marital status: Unknown     Spouse name: Not on file    Number of children: Not on file    Years of education: Not on file    Highest education level: Not on file   Occupational History    Not on file   Other Topics Concern    Not on file   Social History Narrative    Not on file        Family Psychiatric History:     Family History[4]    Medical History Reviewed by provider this encounter:  Meds  Problems  Med Hx  Surg Hx  Fam Hx          Objective   There were no vitals taken for this visit.     Mental Status Evaluation:    Appearance age appropriate, casually dressed   Behavior cooperative   Speech normal rate, normal volume   Mood euthymic   Affect constricted   Thought Processes organized, goal directed   Thought Content no overt delusions    Perceptual Disturbances: no auditory hallucinations, no visual hallucinations   Abnormal Thoughts  Risk Potential Suicidal ideation - None  Homicidal ideation - None  Potential for aggression - No   Orientation grossly oriented   Memory recent and remote memory grossly intact   Consciousness alert and awake   Attention Span Concentration Span attention span and concentration are age appropriate   Intellect appears to be of average intelligence   Insight limited   Judgement limited   Muscle Strength and  Gait unable to assess today due to virtual visit   Motor activity unable to assess today due to virtual visit   Language no difficulty naming common objects   Fund of Knowledge adequate knowledge of current events       Laboratory Results: I have personally reviewed all pertinent laboratory/tests results        Suicide/Homicide Risk Assessment:    Risk of Harm to Self:  Based on today's assessment, Cassy presents the following risk of harm to self: minimal    Risk of Harm to Others:  Based on today's assessment, Cassy presents the following risk of harm to others: none    The following interventions are recommended: Continue medication management.    Psychotherapy Provided:     Individual psychotherapy provided: No    Treatment Plan:    Completed and signed during the session: yes, not signed due to virtual visit    Goals: Progress towards Treatment Plan goals - Yes, progressing slowly, as evidenced by subjective findings in HPI/Subjective Section    Depression Follow-up Plan Completed: Not applicable    Note Share:        Administrative Statements   Administrative Statements   Encounter provider Cori Jesus MD    The Patient is located at Home and in the following state in which I hold an active license [place].    The patient was identified by name and date of birth. Cassy Peraza was informed that this is a telemedicine visit and that the visit is being conducted through the Epic Embedded platform. She agrees to  proceed..  My office door was closed. No one else was in the room.  She acknowledged consent and understanding of privacy and security of the video platform. The patient has agreed to participate and understands they can discontinue the visit at any time.    I have spent a total time of 45 minutes in caring for this patient on the day of the visit/encounter including Risks and benefits of tx options, Instructions for management, Documenting in the medical record, Reviewing/placing orders in the medical record (including tests, medications, and/or procedures), and Communicating with other healthcare professionals , not including the time spent for establishing the audio/video connection.    Visit Time  Face to face  Visit Start Time: 4.00 pm   Visit Stop Time: 4.20 pm   Total Visit Duration: 45 minutes total spent in patient care        Cori Jesus MD 07/10/25       [1]   Past Medical History:  Diagnosis Date    Gastroparesis    [2] No past surgical history on file.  [3] Not on File  [4] No family history on file.

## 2025-07-10 NOTE — BH TREATMENT PLAN
TREATMENT PLAN (Medication Management Only)        [facility]    Name and Date of Birth:  Cassy Peraza 60 y.o. 1965  MRN: [id number]  Date of Treatment Plan: July 10, 2025  Diagnosis/Diagnoses:    1. Major depressive disorder, recurrent, moderate (HCC)    2. Generalized anxiety disorder      Strengths/Personal Resources for Self-Care: supportive family, taking medications as prescribed.  Area/Areas of need (in own words): anxiety, depression  1. Long Term Goal:   maintain mood stability.  Target Date:6 months - 1/10/2026  Person/Persons responsible for completion of goal: Cassy  2.  Short Term Objective (s) - How will we reach this goal?:   A.  Provider new recommended medication/dosage changes and/or continue medication(s): continue current medications as prescribed Cymbalta, Remeron, Lamictal, Seroquel, Buspar, and Clonidine.  B.  N/A.  C.  N/A.  Target Date:6 months - 1/10/2026  Person/Persons Responsible for Completion of Goal: Cassy  Progress Towards Goals: continuing treatment  Treatment Modality: medication management every 4 weeks  Review due 180 days from date of this plan: 6 months - 1/10/2026  Expected length of service: ongoing treatment unless revised  My Physician/PA/NP and I have developed this plan together and I agree to work on the goals and objectives. I understand the treatment goals that were developed for my treatment.   Electronic Signatures: on file (unless signed below)    Cori Jesus MD 07/10/25

## 2025-07-10 NOTE — PATIENT INSTRUCTIONS
Continue seroquel 100 mg qd, 500 mg hs  Stop propranolol  Continue other meds, pt has some supply  Ortho f/u re: changing pain meds with less risk of OD

## 2025-07-11 ENCOUNTER — TELEPHONE (OUTPATIENT)
Age: 60
End: 2025-07-11

## 2025-07-11 ENCOUNTER — TELEPHONE (OUTPATIENT)
Dept: PSYCHIATRY | Facility: CLINIC | Age: 60
End: 2025-07-11

## 2025-07-11 DIAGNOSIS — G47.00 INSOMNIA, UNSPECIFIED TYPE: ICD-10-CM

## 2025-07-11 RX ORDER — MIRTAZAPINE 45 MG/1
45 TABLET, FILM COATED ORAL
Qty: 90 TABLET | Refills: 0 | OUTPATIENT
Start: 2025-07-11

## 2025-07-11 NOTE — TELEPHONE ENCOUNTER
Cassy Peraza and/or Parent/Guardian is requesting a letter/form for returning back to work to be completed.     Date due: as soon as possible    Special notes: patient stated she needs a work release so that she can go back to work tomorrow 7/12/25.       Informed Cassy Peraza and/or Parent/Guardian that a DEWEY must be completed in order for letter/form to be provided.

## 2025-07-11 NOTE — TELEPHONE ENCOUNTER
Reason for call:   [x] Refill   [] Prior Auth  [] Other:     Office:   [x] [facility] [place]   [] Specialty/Provider -     Medication: mirtazapine (REMERON) 45 MG tablet     Dose/Frequency: 45 mg, Daily at bedtime     Quantity: 90    Pharmacy: [id number]    Local Pharmacy   Does the patient have enough for 3 days?   [x] Yes   [] No - Send as HP to POD    Mail Away Pharmacy   Does the patient have enough for 10 days?   [] Yes   [] No - Send as HP to POD

## 2025-07-11 NOTE — TELEPHONE ENCOUNTER
Patient called in to speak directly with [facility] office in [place] for update regarding letter.    Writer attempted to transfer patient to [facility]. Writer was told to relay message to patient that it will take 7-10 business days for letter and that she will receive a call once the letter is ready to be picked up. Writer was told that because provider is fully remote, it will take longer for provider to complete note. Writer communicated this message to patient.

## 2025-08-13 ENCOUNTER — TELEMEDICINE (OUTPATIENT)
Dept: PSYCHIATRY | Facility: CLINIC | Age: 60
End: 2025-08-13
Payer: COMMERCIAL

## 2025-08-14 ENCOUNTER — TELEPHONE (OUTPATIENT)
Dept: PSYCHIATRY | Facility: CLINIC | Age: 60
End: 2025-08-14

## 2025-08-14 ENCOUNTER — TELEPHONE (OUTPATIENT)
Age: 60
End: 2025-08-14